# Patient Record
Sex: MALE | Race: WHITE | NOT HISPANIC OR LATINO | URBAN - METROPOLITAN AREA
[De-identification: names, ages, dates, MRNs, and addresses within clinical notes are randomized per-mention and may not be internally consistent; named-entity substitution may affect disease eponyms.]

---

## 2017-03-29 ENCOUNTER — OUTPATIENT (OUTPATIENT)
Dept: OUTPATIENT SERVICES | Facility: HOSPITAL | Age: 82
LOS: 1 days | End: 2017-03-29

## 2017-07-03 ENCOUNTER — INPATIENT (INPATIENT)
Facility: HOSPITAL | Age: 82
LOS: 7 days | Discharge: NURSING, REHAB OR LONG-TERM CARE | End: 2017-07-11
Payer: MEDICARE

## 2017-07-03 ENCOUNTER — OUTPATIENT (OUTPATIENT)
Dept: OUTPATIENT SERVICES | Facility: HOSPITAL | Age: 82
LOS: 1 days | End: 2017-07-03

## 2017-07-03 PROCEDURE — 73502 X-RAY EXAM HIP UNI 2-3 VIEWS: CPT | Mod: 26,RT

## 2017-07-03 PROCEDURE — 71260 CT THORAX DX C+: CPT | Mod: 26

## 2017-07-03 PROCEDURE — 70450 CT HEAD/BRAIN W/O DYE: CPT | Mod: 26

## 2017-07-03 PROCEDURE — 71101 X-RAY EXAM UNILAT RIBS/CHEST: CPT | Mod: 26,RT

## 2017-07-03 PROCEDURE — 74177 CT ABD & PELVIS W/CONTRAST: CPT | Mod: 26

## 2017-07-03 PROCEDURE — 72170 X-RAY EXAM OF PELVIS: CPT | Mod: 26

## 2017-07-03 PROCEDURE — 99284 EMERGENCY DEPT VISIT MOD MDM: CPT

## 2017-07-03 PROCEDURE — 72125 CT NECK SPINE W/O DYE: CPT | Mod: 26

## 2017-07-03 PROCEDURE — 71010: CPT | Mod: 26

## 2017-07-04 ENCOUNTER — OUTPATIENT (OUTPATIENT)
Dept: OUTPATIENT SERVICES | Facility: HOSPITAL | Age: 82
LOS: 1 days | End: 2017-07-04

## 2017-07-04 PROCEDURE — 73501 X-RAY EXAM HIP UNI 1 VIEW: CPT | Mod: 26,RT

## 2017-07-05 ENCOUNTER — OUTPATIENT (OUTPATIENT)
Dept: OUTPATIENT SERVICES | Facility: HOSPITAL | Age: 82
LOS: 1 days | End: 2017-07-05

## 2017-07-05 PROCEDURE — 99232 SBSQ HOSP IP/OBS MODERATE 35: CPT

## 2017-07-06 ENCOUNTER — OUTPATIENT (OUTPATIENT)
Dept: OUTPATIENT SERVICES | Facility: HOSPITAL | Age: 82
LOS: 1 days | End: 2017-07-06

## 2017-07-06 PROCEDURE — 76770 US EXAM ABDO BACK WALL COMP: CPT | Mod: 26

## 2017-07-06 PROCEDURE — 99232 SBSQ HOSP IP/OBS MODERATE 35: CPT

## 2017-07-06 PROCEDURE — 76856 US EXAM PELVIC COMPLETE: CPT | Mod: 26

## 2017-07-07 ENCOUNTER — OUTPATIENT (OUTPATIENT)
Dept: OUTPATIENT SERVICES | Facility: HOSPITAL | Age: 82
LOS: 1 days | End: 2017-07-07

## 2017-07-07 PROCEDURE — 71010: CPT | Mod: 26

## 2017-07-08 ENCOUNTER — OUTPATIENT (OUTPATIENT)
Dept: OUTPATIENT SERVICES | Facility: HOSPITAL | Age: 82
LOS: 1 days | End: 2017-07-08

## 2017-07-09 ENCOUNTER — OUTPATIENT (OUTPATIENT)
Dept: OUTPATIENT SERVICES | Facility: HOSPITAL | Age: 82
LOS: 1 days | End: 2017-07-09

## 2017-07-10 ENCOUNTER — OUTPATIENT (OUTPATIENT)
Dept: OUTPATIENT SERVICES | Facility: HOSPITAL | Age: 82
LOS: 1 days | End: 2017-07-10

## 2017-07-10 PROCEDURE — 71020: CPT | Mod: 26

## 2018-05-15 PROCEDURE — 72170 X-RAY EXAM OF PELVIS: CPT | Mod: 26,59

## 2018-05-15 PROCEDURE — 73503 X-RAY EXAM HIP UNI 4/> VIEWS: CPT | Mod: 26,LT

## 2018-05-15 PROCEDURE — 71045 X-RAY EXAM CHEST 1 VIEW: CPT | Mod: 26

## 2018-05-15 PROCEDURE — 99285 EMERGENCY DEPT VISIT HI MDM: CPT

## 2018-05-16 ENCOUNTER — OUTPATIENT (OUTPATIENT)
Dept: OUTPATIENT SERVICES | Facility: HOSPITAL | Age: 83
LOS: 1 days | End: 2018-05-16

## 2018-05-16 ENCOUNTER — INPATIENT (INPATIENT)
Facility: HOSPITAL | Age: 83
LOS: 2 days | Discharge: NURSING, REHAB OR LONG-TERM CARE | End: 2018-05-19
Payer: MEDICARE

## 2018-05-16 PROCEDURE — 73501 X-RAY EXAM HIP UNI 1 VIEW: CPT | Mod: 26,LT

## 2018-05-16 PROCEDURE — 72125 CT NECK SPINE W/O DYE: CPT | Mod: 26

## 2018-05-16 PROCEDURE — 74177 CT ABD & PELVIS W/CONTRAST: CPT | Mod: 26

## 2018-05-16 PROCEDURE — 99223 1ST HOSP IP/OBS HIGH 75: CPT

## 2018-05-16 PROCEDURE — 70450 CT HEAD/BRAIN W/O DYE: CPT | Mod: 26

## 2018-05-16 PROCEDURE — 71260 CT THORAX DX C+: CPT | Mod: 26

## 2018-05-17 ENCOUNTER — OUTPATIENT (OUTPATIENT)
Dept: OUTPATIENT SERVICES | Facility: HOSPITAL | Age: 83
LOS: 1 days | End: 2018-05-17

## 2018-05-17 PROCEDURE — 99232 SBSQ HOSP IP/OBS MODERATE 35: CPT

## 2018-05-18 ENCOUNTER — OUTPATIENT (OUTPATIENT)
Dept: OUTPATIENT SERVICES | Facility: HOSPITAL | Age: 83
LOS: 1 days | End: 2018-05-18

## 2018-05-25 ENCOUNTER — OUTPATIENT (OUTPATIENT)
Dept: OUTPATIENT SERVICES | Facility: HOSPITAL | Age: 83
LOS: 1 days | End: 2018-05-25

## 2018-05-25 ENCOUNTER — EMERGENCY (EMERGENCY)
Facility: HOSPITAL | Age: 83
LOS: 1 days | End: 2018-05-25
Payer: MEDICARE

## 2018-05-25 PROCEDURE — 71045 X-RAY EXAM CHEST 1 VIEW: CPT | Mod: 26

## 2018-05-25 PROCEDURE — 99291 CRITICAL CARE FIRST HOUR: CPT

## 2018-05-26 ENCOUNTER — INPATIENT (INPATIENT)
Facility: HOSPITAL | Age: 83
LOS: 12 days | Discharge: NURSING, REHAB OR LONG-TERM CARE | DRG: 871 | End: 2018-06-08
Attending: HOSPITALIST | Admitting: HOSPITALIST
Payer: MEDICARE

## 2018-05-26 VITALS
HEART RATE: 103 BPM | RESPIRATION RATE: 20 BRPM | SYSTOLIC BLOOD PRESSURE: 93 MMHG | DIASTOLIC BLOOD PRESSURE: 54 MMHG | OXYGEN SATURATION: 97 %

## 2018-05-26 DIAGNOSIS — A41.9 SEPSIS, UNSPECIFIED ORGANISM: ICD-10-CM

## 2018-05-26 LAB
ALBUMIN SERPL ELPH-MCNC: 2.7 G/DL — LOW (ref 3.3–5.2)
ALP SERPL-CCNC: 130 U/L — HIGH (ref 40–120)
ALT FLD-CCNC: 8 U/L — SIGNIFICANT CHANGE UP
ANION GAP SERPL CALC-SCNC: 13 MMOL/L — SIGNIFICANT CHANGE UP (ref 5–17)
APPEARANCE UR: ABNORMAL
APTT BLD: 28.4 SEC — SIGNIFICANT CHANGE UP (ref 27.5–37.4)
AST SERPL-CCNC: 36 U/L — SIGNIFICANT CHANGE UP
BACTERIA # UR AUTO: ABNORMAL
BASOPHILS # BLD AUTO: 0 K/UL — SIGNIFICANT CHANGE UP (ref 0–0.2)
BASOPHILS NFR BLD AUTO: 0.1 % — SIGNIFICANT CHANGE UP (ref 0–2)
BILIRUB SERPL-MCNC: 0.6 MG/DL — SIGNIFICANT CHANGE UP (ref 0.4–2)
BILIRUB UR-MCNC: NEGATIVE — SIGNIFICANT CHANGE UP
BLD GP AB SCN SERPL QL: SIGNIFICANT CHANGE UP
BUN SERPL-MCNC: 43 MG/DL — HIGH (ref 8–20)
CALCIUM SERPL-MCNC: 7.7 MG/DL — LOW (ref 8.6–10.2)
CHLORIDE SERPL-SCNC: 109 MMOL/L — HIGH (ref 98–107)
CK MB CFR SERPL CALC: 16 NG/ML — HIGH (ref 0–6.7)
CK SERPL-CCNC: 622 U/L — HIGH (ref 30–200)
CO2 SERPL-SCNC: 21 MMOL/L — LOW (ref 22–29)
COLOR SPEC: YELLOW — SIGNIFICANT CHANGE UP
CREAT SERPL-MCNC: 2.5 MG/DL — HIGH (ref 0.5–1.3)
DIFF PNL FLD: ABNORMAL
EOSINOPHIL # BLD AUTO: 0.1 K/UL — SIGNIFICANT CHANGE UP (ref 0–0.5)
EOSINOPHIL NFR BLD AUTO: 0.4 % — SIGNIFICANT CHANGE UP (ref 0–5)
EPI CELLS # UR: SIGNIFICANT CHANGE UP
GLUCOSE SERPL-MCNC: 122 MG/DL — HIGH (ref 70–115)
GLUCOSE UR QL: NEGATIVE MG/DL — SIGNIFICANT CHANGE UP
HCT VFR BLD CALC: 25 % — LOW (ref 42–52)
HGB BLD-MCNC: 8.1 G/DL — LOW (ref 14–18)
INR BLD: 1.2 RATIO — HIGH (ref 0.88–1.16)
KETONES UR-MCNC: NEGATIVE — SIGNIFICANT CHANGE UP
LACTATE BLDV-MCNC: 1.4 MMOL/L — SIGNIFICANT CHANGE UP (ref 0.5–2)
LEUKOCYTE ESTERASE UR-ACNC: ABNORMAL
LIDOCAIN IGE QN: 11 U/L — LOW (ref 22–51)
LYMPHOCYTES # BLD AUTO: 1.2 K/UL — SIGNIFICANT CHANGE UP (ref 1–4.8)
LYMPHOCYTES # BLD AUTO: 8.3 % — LOW (ref 20–55)
MAGNESIUM SERPL-MCNC: 2.2 MG/DL — SIGNIFICANT CHANGE UP (ref 1.6–2.6)
MCHC RBC-ENTMCNC: 29.6 PG — SIGNIFICANT CHANGE UP (ref 27–31)
MCHC RBC-ENTMCNC: 32.4 G/DL — SIGNIFICANT CHANGE UP (ref 32–36)
MCV RBC AUTO: 91.2 FL — SIGNIFICANT CHANGE UP (ref 80–94)
MONOCYTES # BLD AUTO: 1.1 K/UL — HIGH (ref 0–0.8)
MONOCYTES NFR BLD AUTO: 7.5 % — SIGNIFICANT CHANGE UP (ref 3–10)
NEUTROPHILS # BLD AUTO: 12.4 K/UL — HIGH (ref 1.8–8)
NEUTROPHILS NFR BLD AUTO: 83.4 % — HIGH (ref 37–73)
NITRITE UR-MCNC: NEGATIVE — SIGNIFICANT CHANGE UP
PH UR: 6 — SIGNIFICANT CHANGE UP (ref 5–8)
PHOSPHATE SERPL-MCNC: 3.1 MG/DL — SIGNIFICANT CHANGE UP (ref 2.4–4.7)
PLATELET # BLD AUTO: 200 K/UL — SIGNIFICANT CHANGE UP (ref 150–400)
POTASSIUM SERPL-MCNC: 3.9 MMOL/L — SIGNIFICANT CHANGE UP (ref 3.5–5.3)
POTASSIUM SERPL-SCNC: 3.9 MMOL/L — SIGNIFICANT CHANGE UP (ref 3.5–5.3)
PROT SERPL-MCNC: 5.5 G/DL — LOW (ref 6.6–8.7)
PROT UR-MCNC: 100 MG/DL
PROTHROM AB SERPL-ACNC: 13.3 SEC — HIGH (ref 9.8–12.7)
RBC # BLD: 2.74 M/UL — LOW (ref 4.6–6.2)
RBC # FLD: 14.5 % — SIGNIFICANT CHANGE UP (ref 11–15.6)
RBC CASTS # UR COMP ASSIST: ABNORMAL /HPF (ref 0–4)
SODIUM SERPL-SCNC: 143 MMOL/L — SIGNIFICANT CHANGE UP (ref 135–145)
SP GR SPEC: 1.01 — SIGNIFICANT CHANGE UP (ref 1.01–1.02)
TROPONIN T SERPL-MCNC: 0.23 NG/ML — HIGH (ref 0–0.06)
TYPE + AB SCN PNL BLD: SIGNIFICANT CHANGE UP
UROBILINOGEN FLD QL: NEGATIVE MG/DL — SIGNIFICANT CHANGE UP
VALPROATE SERPL-MCNC: 17.2 UG/ML — LOW (ref 50–100)
WBC # BLD: 14.8 K/UL — HIGH (ref 4.8–10.8)
WBC # FLD AUTO: 14.8 K/UL — HIGH (ref 4.8–10.8)
WBC UR QL: SIGNIFICANT CHANGE UP

## 2018-05-26 PROCEDURE — 99285 EMERGENCY DEPT VISIT HI MDM: CPT

## 2018-05-26 PROCEDURE — 71045 X-RAY EXAM CHEST 1 VIEW: CPT | Mod: 26

## 2018-05-26 PROCEDURE — 93010 ELECTROCARDIOGRAM REPORT: CPT

## 2018-05-26 PROCEDURE — 93970 EXTREMITY STUDY: CPT | Mod: 26

## 2018-05-26 PROCEDURE — 12345: CPT | Mod: NC

## 2018-05-26 RX ORDER — QUETIAPINE FUMARATE 200 MG/1
50 TABLET, FILM COATED ORAL DAILY
Qty: 0 | Refills: 0 | Status: DISCONTINUED | OUTPATIENT
Start: 2018-05-26 | End: 2018-06-08

## 2018-05-26 RX ORDER — DIVALPROEX SODIUM 500 MG/1
250 TABLET, DELAYED RELEASE ORAL
Qty: 0 | Refills: 0 | Status: DISCONTINUED | OUTPATIENT
Start: 2018-05-26 | End: 2018-05-26

## 2018-05-26 RX ORDER — PIPERACILLIN AND TAZOBACTAM 4; .5 G/20ML; G/20ML
3.38 INJECTION, POWDER, LYOPHILIZED, FOR SOLUTION INTRAVENOUS ONCE
Qty: 0 | Refills: 0 | Status: DISCONTINUED | OUTPATIENT
Start: 2018-05-26 | End: 2018-05-26

## 2018-05-26 RX ORDER — VANCOMYCIN HCL 1 G
1000 VIAL (EA) INTRAVENOUS ONCE
Qty: 0 | Refills: 0 | Status: COMPLETED | OUTPATIENT
Start: 2018-05-26 | End: 2018-05-26

## 2018-05-26 RX ORDER — SACCHAROMYCES BOULARDII 250 MG
250 POWDER IN PACKET (EA) ORAL
Qty: 0 | Refills: 0 | Status: DISCONTINUED | OUTPATIENT
Start: 2018-05-26 | End: 2018-06-06

## 2018-05-26 RX ORDER — QUETIAPINE FUMARATE 200 MG/1
100 TABLET, FILM COATED ORAL AT BEDTIME
Qty: 0 | Refills: 0 | Status: DISCONTINUED | OUTPATIENT
Start: 2018-05-26 | End: 2018-06-03

## 2018-05-26 RX ORDER — DONEPEZIL HYDROCHLORIDE 10 MG/1
5 TABLET, FILM COATED ORAL AT BEDTIME
Qty: 0 | Refills: 0 | Status: DISCONTINUED | OUTPATIENT
Start: 2018-05-26 | End: 2018-06-08

## 2018-05-26 RX ORDER — PIPERACILLIN AND TAZOBACTAM 4; .5 G/20ML; G/20ML
3.38 INJECTION, POWDER, LYOPHILIZED, FOR SOLUTION INTRAVENOUS EVERY 8 HOURS
Qty: 0 | Refills: 0 | Status: DISCONTINUED | OUTPATIENT
Start: 2018-05-26 | End: 2018-05-27

## 2018-05-26 RX ORDER — ATORVASTATIN CALCIUM 80 MG/1
80 TABLET, FILM COATED ORAL AT BEDTIME
Qty: 0 | Refills: 0 | Status: DISCONTINUED | OUTPATIENT
Start: 2018-05-26 | End: 2018-06-08

## 2018-05-26 RX ORDER — SODIUM CHLORIDE 9 MG/ML
1000 INJECTION INTRAMUSCULAR; INTRAVENOUS; SUBCUTANEOUS
Qty: 0 | Refills: 0 | Status: DISCONTINUED | OUTPATIENT
Start: 2018-05-26 | End: 2018-05-28

## 2018-05-26 RX ORDER — MEMANTINE HYDROCHLORIDE 10 MG/1
10 TABLET ORAL DAILY
Qty: 0 | Refills: 0 | Status: DISCONTINUED | OUTPATIENT
Start: 2018-05-26 | End: 2018-06-08

## 2018-05-26 RX ADMIN — Medication 2 MILLIGRAM(S): at 21:18

## 2018-05-26 RX ADMIN — Medication 250 MILLIGRAM(S): at 18:34

## 2018-05-26 RX ADMIN — SODIUM CHLORIDE 150 MILLILITER(S): 9 INJECTION INTRAMUSCULAR; INTRAVENOUS; SUBCUTANEOUS at 11:00

## 2018-05-26 NOTE — H&P ADULT - ASSESSMENT
84M with altered mental status and urine retention    Sepsis / Urinary tract infection - Empiric antibiotics were initiated. Urine studies pending.    Urine retention - Urinary catheter placed by Urology. Initially with hematuria, but now the urine is slightly tinged after continuous bladder irrigation.    Acute kidney injury - Urinary catheter in place and draining freely. Repeat laboratory studies ordered to monitor.    Toxic metabolic encephalopathy. Dementia - On donepazil and memantine. Constant observation initiated in the emergency department as the patient was pulling on urinary catheter. CT of the head is pending. The patient was also noted to be on divalproex which will be continued.    Hypertension - Antihypertensive medications to be held until the blood pressure has improved.    Lower extremity edema - Lower extremity doppler pending.    CAD - On atorvastatin.    MOLST form reviewed with DNR/DNI noted. 84M with altered mental status and urine retention    Sepsis / Urinary tract infection - Empiric antibiotics were initiated. Urine studies pending.    Urine retention - Urinary catheter placed by Urology. Initially with hematuria, but now the urine is slightly tinged after continuous bladder irrigation.    Acute kidney injury - Urinary catheter in place and draining freely. Repeat laboratory studies ordered to monitor.    Toxic metabolic encephalopathy. Dementia - On donepazil and memantine. Constant observation initiated in the emergency department as the patient was pulling on urinary catheter. CT of the head is pending. The patient was also noted to be on divalproex but the family reported that he was previously on quetiapine which was more effective.    Hypertension - Antihypertensive medications to be held until the blood pressure has improved.    Lower extremity edema - Lower extremity doppler pending.    CAD - On atorvastatin.    MOLST form reviewed with DNR/DNI noted.

## 2018-05-26 NOTE — ED PROVIDER NOTE - PHYSICAL EXAMINATION
Constitutional : Appears uncomfortably, talking in incomprehensible words  Head :NC AT , no swelling  Eyes :eomi spontaneous no swelling  Mouth :mm moist,  Neck : supple, trachea in midline  Chest :Oscar air entry, symm chest expansion, no distress  Heart :S1 S2 distant  Abdomen :abd soft, suprapubic fullness noted, moans on palpation  no groin erythema, early attemptd in ed  Musc/Skel :ext + swelling, no deformity, no step off, distal pulses present  moves ext spontaneously, surgical scar from hip noted, no surrounding erythema, no swelling  Neuro  :AA unable to evaluate no communication, moves upper ext,

## 2018-05-26 NOTE — H&P ADULT - HISTORY OF PRESENT ILLNESS
The patient is noted to be awake and alert on interview but unable to provide any significant history. Information was obtained through review of the available transfer documentation.   84M with a prior admission to [facility] for a hip fracture and subsequent transfer to [facility] who was found to have altered mental status and urine retention. The patient was transferred to [facility] for further management. Patel catheter placement was attempted with resulting hematuria and inability to pass the catheter. The patient was transferred to [facility] for further treatment. Transfer documentation noted that the patient was found to be combative and confused. There was noted of decreased urine output. There was also noted of a fever of 101.3. The patient had no specific complaints. There was also documentation of a prior conversation with the patient's family who also noted similar confusion after his prior hip surgery. Further information is otherwise limited due to the patient's medical condition.  In the emergency department, the patient was seen by Urology in consultation who noted a history of prostate cancer and was able to pass a urine catheter with resulting hematuria.    PSH: Hip replacement, Knee surgery

## 2018-05-26 NOTE — ED ADULT NURSE NOTE - CHIEF COMPLAINT QUOTE
pt transfer from [place] for urinary consult. per Jakob RN @ [facility], pt sent to ED for urinary difficulty, unable to get early on pt so pt transferred to [facility] for urology team consult. hx dementia. pt received on dopamine drip at 10mcg/kg/min. BP in ED 93/54, .  MD called to bedside for eval. pt transferred to CC room.

## 2018-05-26 NOTE — PROCEDURE NOTE - NSURITECHNIQUE_GU_A_CORE
All applicable medical record documentation is completed/Proper hand hygiene was performed/Sterile gloves were worn for the duration of the procedure/The catheter was appropriately lubricated/The urinary drainage system is closed at the end of the procedure/The collection bag is below the level of the patient and urinary bladder

## 2018-05-26 NOTE — PROCEDURE NOTE - NSFINDINGS_GEN_A_CORE
unable to place Stat-Lock due to small amount of catheter coming out of urethral meatus/hematuria/positive return of urine in the collection bag

## 2018-05-26 NOTE — ED ADULT NURSE REASSESSMENT NOTE - NS ED NURSE REASSESS COMMENT FT1
Late Note  Time is approximate  Report received at change of shift from outgoing MILADIS Moran and assumed care of pt at that time. Pt received on stretcher in yellow gown in no acute distress, resting comfortably at present but arouses to voice and subsequently confused, unable to answer questions appropriately, at risk for harm by pulling out early and or IV tubing, at risk for falls, NA at bedside at all times for one to one constant observation. Pt noted to have Dopamine infusing and this was discontinued by outgoing MILADIS Jaramillo as no longer needed. Will maintain safety and continue to monitor.

## 2018-05-26 NOTE — PROCEDURE NOTE - PROCEDURE
<<-----Click on this checkbox to enter Procedure Patel catheter to dependent drainage  05/26/2018  CBI started  Active  [email or web address]

## 2018-05-26 NOTE — ED PROVIDER NOTE - OBJECTIVE STATEMENT
84y old was seen and transferred from [facility]. patient was seen for a fall, has had hip fracture, was transferred to a NH, sent back to [facility] for ams, found to have fever, labs and sepsis protocol followed, unable to urinate, early attempted unsuccessful, and transferred to [place] for early

## 2018-05-26 NOTE — PROCEDURE NOTE - NSINDICATIONS_GEN_A_CORE
other/bladder distention/urinry obstruction or retention/inability of staff at 2 hospitals to insert early

## 2018-05-26 NOTE — H&P ADULT - NSHPPHYSICALEXAM_GEN_ALL_CORE
Vital Signs Last 24 Hrs  T(C): 37 (26 May 2018 04:01), Max: 37 (26 May 2018 04:01)  T(F): 98.6 (26 May 2018 04:01), Max: 98.6 (26 May 2018 04:01)  HR: 92 (26 May 2018 08:23) (92 - 104)  BP: 105/52 (26 May 2018 08:23) (90/51 - 113/54)  BP(mean): --  RR: 18 (26 May 2018 08:23) (18 - 20)  SpO2: 100% (26 May 2018 08:23) (97% - 100%)    General appearance: No acute distress, Awake, Alert, Disoriented  HEENT: Normocephalic, Atraumatic, Conjunctiva clear, EOMI  Neck: Supple, No JVD, No tenderness  Lungs: Clear to auscultation, Breath sound equal bilaterally, No wheezes, No rales  Cardiovascular: S1S2, Regular rhythm  Abdomen: Soft, Nontender, Nondistended, No guarding/rebound, Positive bowel sounds  Extremities: No clubbing, No cyanosis, No calf tenderness, Lower extremity edema  Neuro: Strength equal bilaterally, No tremors  Psychiatric: Appropriate mood, Normal affect

## 2018-05-26 NOTE — CONSULT NOTE ADULT - SUBJECTIVE AND OBJECTIVE BOX
ABBY VIDAL  [id number]  84y, Male        Patient is a 84y old  Male who presents with a chief complaint of abdominal pain    HPI:  Patient presents in transfer from [facility] where he was noted to be septic. bladder scan PVR showed >999 ml. Early not able to be passed.  Patient transferred here due to lack of urologic care there. No apparent fever there but BP apparently low. Had recent hip surgery. Bowel status uncertain. activity status uncertain. Patient unable to provide history. Has baseline dementia. Has PMH of prostate cancer but uncertain how this was treated.     Allergies    No Known Allergies    Intolerances        MEDICATIONS  (STANDING):    MEDICATIONS  (PRN):      PAST MEDICAL & SURGICAL HISTORY:  Asthma  Anxiety  MI (myocardial infarction)  High cholesterol  Prostate CA  HTN (hypertension)  Dementia      REVIEW OF SYSTEMS  unable to obtain due to dementia.    Tob: uncertain                              EtOH: undertain    I&O's Detail      PE:    Vital Signs Last 24 Hrs  T(C): 37 (26 May 2018 04:01), Max: 37 (26 May 2018 04:01)  T(F): 98.6 (26 May 2018 04:01), Max: 98.6 (26 May 2018 04:01)  HR: 103 (26 May 2018 06:35) (103 - 104)  BP: 113/54 (26 May 2018 06:35) (90/51 - 113/54)  BP(mean): --  RR: 18 (26 May 2018 06:35) (18 - 20)  SpO2: 98% (26 May 2018 06:35) (97% - 98%)    Daily Height in cm: 187.96 (26 May 2018 04:10)    Daily     PHYSICAL EXAM:      Constitutional: appears acutely uncomfortable, not able to lie still.     Eyes: conjunctivae normal    ENMT: NC/AT    Neck: no adenopathy    Respiratory: no respiratory distress    Cardiovascular: good cap refill    Gastrointestinal: lower abdominal mass, midline, tender, no peritoneal signs, no upper abdominal tenderness.    Genitourinary: uncirc phallus, early in place but not in bladder based on amount of catheter outside of patient's body. no urine in bag. meatus otherwise normal. scrotum normal.     Rectal: unable to perform    Extremities: soem peripheral edema noted.     Neurological: not oriented    Skin: no jaundice    Musculoskeletal: good strength    Psychiatric: not oriented        Labs:    Impression:  Urinary retention  abdominal pain due to urinary retention      Plan:    I was able to palce a early using a catheter guide ,urojet.  see separate note.  Recommend early for one week at minimum.    Maximize bowel function  maximize activity as allowable for patient safety and from ortho perspective.  would try tamsulosin once he is not needing pressors anymore.     Jerrell Alonzo MD  05-26-18 @ 06:45

## 2018-05-26 NOTE — H&P ADULT - PMH
Anxiety    Asthma    Dementia    High cholesterol    HTN (hypertension)    MI (myocardial infarction)    Prostate CA

## 2018-05-26 NOTE — ED ADULT NURSE NOTE - OBJECTIVE STATEMENT
Pt received in Critical care as a transfer from [place] for urinary retention and increased AMS according to the rehab center. According to [facility], providers at [place] were unable to obtain a Patel after 3 attempts. Pt with hx of dementia and refusing to speak to RN. Pt appears comfortable and in NAD but bladder is noticeably distended and pt presents with blood at the urethral meatus. Pt uncooperative and is attempting to get off the stretcher but refuses to say what for. Pt received on Dopamine Drip @ 33.1mcg/kg/min for persistent hypotension. Dr. Dunne at the bedside. Pt received in Critical care as a transfer from [place] for urinary retention and increased AMS according to the rehab center. According to [facility], providers at [place] were unable to obtain a Patel after 3 attempts. Pt with hx of dementia and refusing to speak to RN. Pt appears comfortable and in NAD but bladder is noticeably distended and pt presents with blood at the urethral meatus. Pt uncooperative and is attempting to get off the stretcher but refuses to say what for. Pt received on Dopamine Drip @ 10mcg/kg/min for persistent hypotension. Dr. Dunne at the bedside.

## 2018-05-26 NOTE — PROCEDURE NOTE - NSPROCDETAILS_GEN_ALL_CORE
sterile technique, indwelling urinary device inserted/previously placed early removed prior to prep and insertion of catheter

## 2018-05-26 NOTE — ED PROVIDER NOTE - MEDICAL DECISION MAKING DETAILS
84y old from [facility], with urinary retention, fever, plan early, abox, fluids

## 2018-05-26 NOTE — ED ADULT NURSE REASSESSMENT NOTE - NS ED NURSE REASSESS COMMENT FT1
Lab was called and is aware of stat blood work ordered and location of patient and will come and draw labs.

## 2018-05-26 NOTE — ED ADULT TRIAGE NOTE - CHIEF COMPLAINT QUOTE
pt transfer from [place] for urinary consult. per Jakob RN @ [facility], pt sent to ED for urinary difficulty, unable to get early on pt so pt transferred to [facility] for urology team consult. hx dementia. pt received on dopamine drip at 10mcg/kg/min. BP in ED 93/54, .  MD called to bedside for eval. pt transferred to CC room.

## 2018-05-26 NOTE — ED PROVIDER NOTE - PROGRESS NOTE DETAILS
sharath attemptd in ed, with some blood clots, unsuccessful  urology call, came to see patient in ed patient was given dopamine for transfer , dc in ed after early, and recd fluids

## 2018-05-27 LAB
ANION GAP SERPL CALC-SCNC: 15 MMOL/L — SIGNIFICANT CHANGE UP (ref 5–17)
BUN SERPL-MCNC: 39 MG/DL — HIGH (ref 8–20)
CALCIUM SERPL-MCNC: 8 MG/DL — LOW (ref 8.6–10.2)
CHLORIDE SERPL-SCNC: 108 MMOL/L — HIGH (ref 98–107)
CO2 SERPL-SCNC: 21 MMOL/L — LOW (ref 22–29)
CREAT SERPL-MCNC: 1.52 MG/DL — HIGH (ref 0.5–1.3)
GLUCOSE SERPL-MCNC: 109 MG/DL — SIGNIFICANT CHANGE UP (ref 70–115)
HCT VFR BLD CALC: 25.1 % — LOW (ref 42–52)
HGB BLD-MCNC: 8 G/DL — LOW (ref 14–18)
MCHC RBC-ENTMCNC: 29.4 PG — SIGNIFICANT CHANGE UP (ref 27–31)
MCHC RBC-ENTMCNC: 31.9 G/DL — LOW (ref 32–36)
MCV RBC AUTO: 92.3 FL — SIGNIFICANT CHANGE UP (ref 80–94)
PLATELET # BLD AUTO: 227 K/UL — SIGNIFICANT CHANGE UP (ref 150–400)
POTASSIUM SERPL-MCNC: 3.8 MMOL/L — SIGNIFICANT CHANGE UP (ref 3.5–5.3)
POTASSIUM SERPL-SCNC: 3.8 MMOL/L — SIGNIFICANT CHANGE UP (ref 3.5–5.3)
RBC # BLD: 2.72 M/UL — LOW (ref 4.6–6.2)
RBC # FLD: 14.1 % — SIGNIFICANT CHANGE UP (ref 11–15.6)
SODIUM SERPL-SCNC: 144 MMOL/L — SIGNIFICANT CHANGE UP (ref 135–145)
WBC # BLD: 10.9 K/UL — HIGH (ref 4.8–10.8)
WBC # FLD AUTO: 10.9 K/UL — HIGH (ref 4.8–10.8)

## 2018-05-27 PROCEDURE — 99233 SBSQ HOSP IP/OBS HIGH 50: CPT

## 2018-05-27 PROCEDURE — 70450 CT HEAD/BRAIN W/O DYE: CPT | Mod: 26

## 2018-05-27 PROCEDURE — 99497 ADVNCD CARE PLAN 30 MIN: CPT

## 2018-05-27 RX ORDER — ATORVASTATIN CALCIUM 80 MG/1
0 TABLET, FILM COATED ORAL
Qty: 0 | Refills: 0 | COMMUNITY

## 2018-05-27 RX ORDER — OXYCODONE HYDROCHLORIDE 5 MG/1
0 TABLET ORAL
Qty: 0 | Refills: 0 | COMMUNITY

## 2018-05-27 RX ORDER — METOPROLOL TARTRATE 50 MG
0 TABLET ORAL
Qty: 0 | Refills: 0 | COMMUNITY

## 2018-05-27 RX ORDER — TAMSULOSIN HYDROCHLORIDE 0.4 MG/1
0.4 CAPSULE ORAL AT BEDTIME
Qty: 0 | Refills: 0 | Status: DISCONTINUED | OUTPATIENT
Start: 2018-05-27 | End: 2018-05-28

## 2018-05-27 RX ORDER — DIVALPROEX SODIUM 500 MG/1
0 TABLET, DELAYED RELEASE ORAL
Qty: 0 | Refills: 0 | COMMUNITY

## 2018-05-27 RX ORDER — TAMSULOSIN HYDROCHLORIDE 0.4 MG/1
0 CAPSULE ORAL
Qty: 0 | Refills: 0 | COMMUNITY

## 2018-05-27 RX ORDER — AMLODIPINE BESYLATE 2.5 MG/1
0 TABLET ORAL
Qty: 0 | Refills: 0 | COMMUNITY

## 2018-05-27 RX ORDER — FUROSEMIDE 40 MG
0 TABLET ORAL
Qty: 0 | Refills: 0 | COMMUNITY

## 2018-05-27 RX ORDER — PIPERACILLIN AND TAZOBACTAM 4; .5 G/20ML; G/20ML
3.38 INJECTION, POWDER, LYOPHILIZED, FOR SOLUTION INTRAVENOUS EVERY 8 HOURS
Qty: 0 | Refills: 0 | Status: DISCONTINUED | OUTPATIENT
Start: 2018-05-27 | End: 2018-05-31

## 2018-05-27 RX ADMIN — QUETIAPINE FUMARATE 100 MILLIGRAM(S): 200 TABLET, FILM COATED ORAL at 22:51

## 2018-05-27 RX ADMIN — Medication 250 MILLIGRAM(S): at 06:17

## 2018-05-27 RX ADMIN — PIPERACILLIN AND TAZOBACTAM 25 GRAM(S): 4; .5 INJECTION, POWDER, LYOPHILIZED, FOR SOLUTION INTRAVENOUS at 22:51

## 2018-05-27 RX ADMIN — TAMSULOSIN HYDROCHLORIDE 0.4 MILLIGRAM(S): 0.4 CAPSULE ORAL at 22:51

## 2018-05-27 RX ADMIN — ATORVASTATIN CALCIUM 80 MILLIGRAM(S): 80 TABLET, FILM COATED ORAL at 00:28

## 2018-05-27 RX ADMIN — ATORVASTATIN CALCIUM 80 MILLIGRAM(S): 80 TABLET, FILM COATED ORAL at 22:51

## 2018-05-27 RX ADMIN — DONEPEZIL HYDROCHLORIDE 5 MILLIGRAM(S): 10 TABLET, FILM COATED ORAL at 22:51

## 2018-05-27 RX ADMIN — MEMANTINE HYDROCHLORIDE 10 MILLIGRAM(S): 10 TABLET ORAL at 13:17

## 2018-05-27 RX ADMIN — DONEPEZIL HYDROCHLORIDE 5 MILLIGRAM(S): 10 TABLET, FILM COATED ORAL at 00:28

## 2018-05-27 RX ADMIN — PIPERACILLIN AND TAZOBACTAM 25 GRAM(S): 4; .5 INJECTION, POWDER, LYOPHILIZED, FOR SOLUTION INTRAVENOUS at 06:17

## 2018-05-27 RX ADMIN — QUETIAPINE FUMARATE 50 MILLIGRAM(S): 200 TABLET, FILM COATED ORAL at 13:18

## 2018-05-27 RX ADMIN — QUETIAPINE FUMARATE 100 MILLIGRAM(S): 200 TABLET, FILM COATED ORAL at 00:28

## 2018-05-27 RX ADMIN — PIPERACILLIN AND TAZOBACTAM 25 GRAM(S): 4; .5 INJECTION, POWDER, LYOPHILIZED, FOR SOLUTION INTRAVENOUS at 00:28

## 2018-05-27 RX ADMIN — PIPERACILLIN AND TAZOBACTAM 25 GRAM(S): 4; .5 INJECTION, POWDER, LYOPHILIZED, FOR SOLUTION INTRAVENOUS at 13:18

## 2018-05-27 RX ADMIN — Medication 250 MILLIGRAM(S): at 17:17

## 2018-05-27 NOTE — PROGRESS NOTE ADULT - SUBJECTIVE AND OBJECTIVE BOX
ABBY VIDAL  ----------------------------------------  The patient was seen and evaluated for urine retention.  The patient is in no acute distress.  Offers no complaints.  Aid at bedside reports that the patient tolerated breakfast.    Vital Signs Last 24 Hrs  T(C): 36.4 (27 May 2018 08:48), Max: 36.7 (26 May 2018 23:22)  T(F): 97.5 (27 May 2018 08:48), Max: 98 (26 May 2018 23:22)  HR: 85 (27 May 2018 08:48) (84 - 92)  BP: 134/81 (27 May 2018 08:48) (106/62 - 134/81)  BP(mean): --  RR: 18 (27 May 2018 08:48) (17 - 18)  SpO2: 95% (27 May 2018 08:48) (92% - 98%)    PHYSICAL EXAMINATION:  ----------------------------------------  General appearance: No acute distress, Awake, Alert, Somnolent  HEENT: Normocephalic, Atraumatic, Conjunctiva clear, EOMI  Neck: Supple, No JVD, No tenderness  Lungs: Clear to auscultation, Breath sound equal bilaterally, No wheezes, No rales  Cardiovascular: S1S2, Regular rhythm  Abdomen: Soft, Nontender, Nondistended, No guarding/rebound, Positive bowel sounds  Extremities: No clubbing, No cyanosis, No edema, No calf tenderness, Lower extremity edema  Neuro: Strength equal bilaterally, No tremors  Psychiatric: Appropriate mood, Normal affect      LABORATORY STUDIES:  ----------------------------------------             8.0    10.9  )-----------( 227      ( 27 May 2018 07:36 )             25.1     05-27    144  |  108<H>  |  39.0<H>  ----------------------------<  109  3.8   |  21.0<L>  |  1.52<H>    Ca    8.0<L>      27 May 2018 07:36  Phos  3.1       Mg     2.2         TPro  5.5<L>  /  Alb  2.7<L>  /  TBili  0.6  /  DBili  x   /  AST  36  /  ALT  8   /  AlkPhos  130<H>      LIVER FUNCTIONS - ( 26 May 2018 11:57 )  Alb: 2.7 g/dL / Pro: 5.5 g/dL / ALK PHOS: 130 U/L / ALT: 8 U/L / AST: 36 U/L / GGT: x           PT/INR - ( 26 May 2018 11:57 )   PT: 13.3 sec;   INR: 1.20 ratio    PTT - ( 26 May 2018 11:57 )  PTT:28.4 sec    CARDIAC MARKERS ( 26 May 2018 11:57 )  x     / 0.23 ng/mL / 622 U/L / x     / 16.0 ng/mL    Urinalysis Basic - ( 26 May 2018 22:42 )  Color: Yellow / Appearance: Bloody / S.010 / pH: x  Gluc: x / Ketone: Negative  / Bili: Negative / Urobili: Negative mg/dL   Blood: x / Protein: 100 mg/dL / Nitrite: Negative   Leuk Esterase: Small / RBC: 25-50 /HPF / WBC 0-2   Sq Epi: x / Non Sq Epi: Occasional / Bacteria: Occasional    MEDICATIONS  (STANDING):  atorvastatin 80 milliGRAM(s) Oral at bedtime  donepezil 5 milliGRAM(s) Oral at bedtime  memantine 10 milliGRAM(s) Oral daily  piperacillin/tazobactam IVPB. 3.375 Gram(s) IV Intermittent every 8 hours  QUEtiapine 50 milliGRAM(s) Oral daily  QUEtiapine 100 milliGRAM(s) Oral at bedtime  saccharomyces boulardii 250 milliGRAM(s) Oral two times a day  sodium chloride 0.9%. 1000 milliLiter(s) (150 mL/Hr) IV Continuous <Continuous>      ASSESSMENT / PLAN:  ----------------------------------------  Sepsis / Urinary tract infection - On piperacillin/tazobactam. Urine culture results to be followed.    Urine retention - Urine catheter in place. Hematuria improved with continuous bladder irrigation. Few clots noted. Blood pressure improved, to start on tamsulosin. Constant observation as the patient had a history of pulling out medical devices.    Acute kidney injury - Urinary catheter in place and draining freely. Repeat laboratory studies with improvement in renal function.    Toxic metabolic encephalopathy / Dementia - On donepezil memantine, and quetiapine. CT of the head is pending.    Hypertension - Antihypertensive medications to be held until the blood pressure has improved.    Lower extremity edema - Lower extremity doppler was without DVT. Furosemide held on admission due to hypotension and acute kidney injury.    CAD - On atorvastatin.    MOLST form reviewed with DNR/DNI noted.

## 2018-05-28 LAB
ANION GAP SERPL CALC-SCNC: 12 MMOL/L — SIGNIFICANT CHANGE UP (ref 5–17)
BASE EXCESS BLDA CALC-SCNC: -4.3 MMOL/L — LOW (ref -2–2)
BLOOD GAS COMMENTS ARTERIAL: SIGNIFICANT CHANGE UP
BUN SERPL-MCNC: 22 MG/DL — HIGH (ref 8–20)
CALCIUM SERPL-MCNC: 7.9 MG/DL — LOW (ref 8.6–10.2)
CHLORIDE SERPL-SCNC: 114 MMOL/L — HIGH (ref 98–107)
CO2 SERPL-SCNC: 22 MMOL/L — SIGNIFICANT CHANGE UP (ref 22–29)
CREAT SERPL-MCNC: 0.92 MG/DL — SIGNIFICANT CHANGE UP (ref 0.5–1.3)
CULTURE RESULTS: NO GROWTH — SIGNIFICANT CHANGE UP
GAS PNL BLDA: SIGNIFICANT CHANGE UP
GLUCOSE SERPL-MCNC: 132 MG/DL — HIGH (ref 70–115)
HCO3 BLDA-SCNC: 21 MMOL/L — SIGNIFICANT CHANGE UP (ref 20–26)
HCT VFR BLD CALC: 25 % — LOW (ref 42–52)
HGB BLD-MCNC: 8 G/DL — LOW (ref 14–18)
HOROWITZ INDEX BLDA+IHG-RTO: SIGNIFICANT CHANGE UP
MCHC RBC-ENTMCNC: 29.9 PG — SIGNIFICANT CHANGE UP (ref 27–31)
MCHC RBC-ENTMCNC: 32 G/DL — SIGNIFICANT CHANGE UP (ref 32–36)
MCV RBC AUTO: 93.3 FL — SIGNIFICANT CHANGE UP (ref 80–94)
NT-PROBNP SERPL-SCNC: HIGH PG/ML (ref 0–300)
PCO2 BLDA: 46 MMHG — HIGH (ref 35–45)
PH BLDA: 7.29 — LOW (ref 7.35–7.45)
PLATELET # BLD AUTO: 228 K/UL — SIGNIFICANT CHANGE UP (ref 150–400)
PO2 BLDA: 74 MMHG — LOW (ref 83–108)
POTASSIUM SERPL-MCNC: 3.8 MMOL/L — SIGNIFICANT CHANGE UP (ref 3.5–5.3)
POTASSIUM SERPL-SCNC: 3.8 MMOL/L — SIGNIFICANT CHANGE UP (ref 3.5–5.3)
RBC # BLD: 2.68 M/UL — LOW (ref 4.6–6.2)
RBC # FLD: 14.2 % — SIGNIFICANT CHANGE UP (ref 11–15.6)
SAO2 % BLDA: 92 % — LOW (ref 95–99)
SODIUM SERPL-SCNC: 148 MMOL/L — HIGH (ref 135–145)
SPECIMEN SOURCE: SIGNIFICANT CHANGE UP
WBC # BLD: 8.5 K/UL — SIGNIFICANT CHANGE UP (ref 4.8–10.8)
WBC # FLD AUTO: 8.5 K/UL — SIGNIFICANT CHANGE UP (ref 4.8–10.8)

## 2018-05-28 PROCEDURE — 99233 SBSQ HOSP IP/OBS HIGH 50: CPT

## 2018-05-28 PROCEDURE — 71045 X-RAY EXAM CHEST 1 VIEW: CPT | Mod: 26

## 2018-05-28 PROCEDURE — 99231 SBSQ HOSP IP/OBS SF/LOW 25: CPT

## 2018-05-28 RX ORDER — FUROSEMIDE 40 MG
40 TABLET ORAL ONCE
Qty: 0 | Refills: 0 | Status: COMPLETED | OUTPATIENT
Start: 2018-05-28 | End: 2018-05-28

## 2018-05-28 RX ORDER — METOPROLOL TARTRATE 50 MG
50 TABLET ORAL DAILY
Qty: 0 | Refills: 0 | Status: DISCONTINUED | OUTPATIENT
Start: 2018-05-28 | End: 2018-06-08

## 2018-05-28 RX ORDER — ALBUTEROL 90 UG/1
2.5 AEROSOL, METERED ORAL ONCE
Qty: 0 | Refills: 0 | Status: COMPLETED | OUTPATIENT
Start: 2018-05-28 | End: 2018-05-28

## 2018-05-28 RX ORDER — IPRATROPIUM/ALBUTEROL SULFATE 18-103MCG
3 AEROSOL WITH ADAPTER (GRAM) INHALATION EVERY 6 HOURS
Qty: 0 | Refills: 0 | Status: DISCONTINUED | OUTPATIENT
Start: 2018-05-28 | End: 2018-06-02

## 2018-05-28 RX ORDER — TAMSULOSIN HYDROCHLORIDE 0.4 MG/1
0.8 CAPSULE ORAL AT BEDTIME
Qty: 0 | Refills: 0 | Status: DISCONTINUED | OUTPATIENT
Start: 2018-05-28 | End: 2018-06-08

## 2018-05-28 RX ADMIN — Medication 40 MILLIGRAM(S): at 22:41

## 2018-05-28 RX ADMIN — ATORVASTATIN CALCIUM 80 MILLIGRAM(S): 80 TABLET, FILM COATED ORAL at 22:12

## 2018-05-28 RX ADMIN — Medication 50 MILLIGRAM(S): at 14:04

## 2018-05-28 RX ADMIN — QUETIAPINE FUMARATE 50 MILLIGRAM(S): 200 TABLET, FILM COATED ORAL at 14:04

## 2018-05-28 RX ADMIN — DONEPEZIL HYDROCHLORIDE 5 MILLIGRAM(S): 10 TABLET, FILM COATED ORAL at 22:12

## 2018-05-28 RX ADMIN — Medication 3 MILLILITER(S): at 15:13

## 2018-05-28 RX ADMIN — PIPERACILLIN AND TAZOBACTAM 25 GRAM(S): 4; .5 INJECTION, POWDER, LYOPHILIZED, FOR SOLUTION INTRAVENOUS at 05:54

## 2018-05-28 RX ADMIN — Medication 250 MILLIGRAM(S): at 14:05

## 2018-05-28 RX ADMIN — PIPERACILLIN AND TAZOBACTAM 25 GRAM(S): 4; .5 INJECTION, POWDER, LYOPHILIZED, FOR SOLUTION INTRAVENOUS at 14:04

## 2018-05-28 RX ADMIN — ALBUTEROL 2.5 MILLIGRAM(S): 90 AEROSOL, METERED ORAL at 21:40

## 2018-05-28 RX ADMIN — TAMSULOSIN HYDROCHLORIDE 0.8 MILLIGRAM(S): 0.4 CAPSULE ORAL at 22:12

## 2018-05-28 RX ADMIN — PIPERACILLIN AND TAZOBACTAM 25 GRAM(S): 4; .5 INJECTION, POWDER, LYOPHILIZED, FOR SOLUTION INTRAVENOUS at 22:12

## 2018-05-28 RX ADMIN — MEMANTINE HYDROCHLORIDE 10 MILLIGRAM(S): 10 TABLET ORAL at 14:04

## 2018-05-28 RX ADMIN — QUETIAPINE FUMARATE 100 MILLIGRAM(S): 200 TABLET, FILM COATED ORAL at 22:12

## 2018-05-28 RX ADMIN — Medication 3 MILLILITER(S): at 20:36

## 2018-05-28 RX ADMIN — Medication 250 MILLIGRAM(S): at 05:55

## 2018-05-28 NOTE — CHART NOTE - NSCHARTNOTEFT_GEN_A_CORE
Called to see pt, pt on IV fluids 150cc/hr. Wheezing started today. IVFs stopped and pt started on neb treatments during the day. Pt on lasix po daily at home. being held for ALBERTO. Pt now R 24 PO 93% using accessory muscles. H/O AMS . unable to give HPI  VS B/P 151/82 R 24 PO 92% T 98.5  general alert breathing slightly labored audible wheeze  Lungs + wheeze  Heart RR  abd +bs soft  Stat ABG  Stat CXR   Stat BNP [id number]  CXR + congestion,-- ABG PH 7.29 PCO2 46 PO2 74 O2 SAT92 HCO3 21  lasix 40 mg IVP  Albuterol neb txment  ABG reviewed with Dr Fonseca. BIPAP ordered  continue to monitor  Call PA if any changes in pts condition

## 2018-05-28 NOTE — PROGRESS NOTE ADULT - SUBJECTIVE AND OBJECTIVE BOX
ABBY YOUNG  ----------------------------------------  The patient was seen and evaluated for urine retention.  The patient is in no acute distress.  More awake and alert today. Disoriented.    Vital Signs Last 24 Hrs  T(C): 36.6 (28 May 2018 00:04), Max: 36.6 (28 May 2018 00:04)  T(F): 97.9 (28 May 2018 00:04), Max: 97.9 (28 May 2018 00:04)  HR: 95 (28 May 2018 00:04) (79 - 95)  BP: 145/64 (28 May 2018 00:04) (133/80 - 148/77)  BP(mean): --  RR: 22 (28 May 2018 00:04) (18 - 22)  SpO2: 92% (28 May 2018 00:04) (92% - 99%)    PHYSICAL EXAMINATION:  ----------------------------------------  General appearance: No acute distress, Awake, Alert  HEENT: Normocephalic, Atraumatic, Conjunctiva clear, EOMI  Neck: Supple, No JVD, No tenderness  Lungs: Clear to auscultation, Breath sound equal bilaterally, No wheezes, No rales  Cardiovascular: S1S2, Regular rhythm  Abdomen: Soft, Nontender, Nondistended, No guarding/rebound, Positive bowel sounds  Extremities: No clubbing, No cyanosis, No edema, No calf tenderness, Lower extremity edema  Neuro: Strength equal bilaterally, No tremors  Psychiatric: Appropriate mood, Normal affect, Disoriented    LABORATORY STUDIES:  ----------------------------------------             8.0    10.9  )-----------( 227      ( 27 May 2018 07:36 )             25.1     05-27    144  |  108<H>  |  39.0<H>  ----------------------------<  109  3.8   |  21.0<L>  |  1.52<H>    Ca    8.0<L>      27 May 2018 07:36  Phos  3.1       Mg     2.2         TPro  5.5<L>  /  Alb  2.7<L>  /  TBili  0.6  /  DBili  x   /  AST  36  /  ALT  8   /  AlkPhos  130<H>      LIVER FUNCTIONS - ( 26 May 2018 11:57 )  Alb: 2.7 g/dL / Pro: 5.5 g/dL / ALK PHOS: 130 U/L / ALT: 8 U/L / AST: 36 U/L / GGT: x           PT/INR - ( 26 May 2018 11:57 )   PT: 13.3 sec;   INR: 1.20 ratio    PTT - ( 26 May 2018 11:57 )  PTT:28.4 sec    CARDIAC MARKERS ( 26 May 2018 11:57 )  x     / 0.23 ng/mL / 622 U/L / x     / 16.0 ng/mL    Urinalysis Basic - ( 26 May 2018 22:42 )  Color: Yellow / Appearance: Bloody / S.010 / pH: x  Gluc: x / Ketone: Negative  / Bili: Negative / Urobili: Negative mg/dL   Blood: x / Protein: 100 mg/dL / Nitrite: Negative   Leuk Esterase: Small / RBC: 25-50 /HPF / WBC 0-2   Sq Epi: x / Non Sq Epi: Occasional / Bacteria: Occasional    MEDICATIONS  (STANDING):  atorvastatin 80 milliGRAM(s) Oral at bedtime  donepezil 5 milliGRAM(s) Oral at bedtime  memantine 10 milliGRAM(s) Oral daily  metoprolol succinate ER 50 milliGRAM(s) Oral daily  piperacillin/tazobactam IVPB. 3.375 Gram(s) IV Intermittent every 8 hours  QUEtiapine 50 milliGRAM(s) Oral daily  QUEtiapine 100 milliGRAM(s) Oral at bedtime  saccharomyces boulardii 250 milliGRAM(s) Oral two times a day  tamsulosin 0.8 milliGRAM(s) Oral at bedtime      ASSESSMENT / PLAN:  ----------------------------------------  Sepsis / Urinary tract infection - Afebrile. Resolved leukocytosis. On piperacillin/tazobactam. Urine culture results to be followed.    Urine retention - Urine catheter in place with clear urine. Tamsulosin dose increased. Constant observation as the patient had a history of pulling out medical devices.    Acute kidney injury - Urinary catheter in place and draining freely. Repeat laboratory studies with improvement in renal function.    Toxic metabolic encephalopathy / Dementia - On donepezil memantine, and quetiapine. CT of the head was without acute intracranial pathology.    Hypertension - Antihypertensive medications were initially held but the blood pressure is now improved and metoprolol is to be restarted.    Lower extremity edema - Lower extremity doppler was without DVT. Furosemide held on admission due to hypotension and acute kidney injury.    CAD - On atorvastatin.    Palliative Care consultation pending. ABBY YOUNG  ----------------------------------------  The patient was seen and evaluated for urine retention.  The patient is in no acute distress.  More awake and alert today. Disoriented.    Vital Signs Last 24 Hrs  T(C): 36.6 (28 May 2018 00:04), Max: 36.6 (28 May 2018 00:04)  T(F): 97.9 (28 May 2018 00:04), Max: 97.9 (28 May 2018 00:04)  HR: 95 (28 May 2018 00:04) (79 - 95)  BP: 145/64 (28 May 2018 00:04) (133/80 - 148/77)  BP(mean): --  RR: 22 (28 May 2018 00:04) (18 - 22)  SpO2: 92% (28 May 2018 00:04) (92% - 99%)    PHYSICAL EXAMINATION:  ----------------------------------------  General appearance: No acute distress, Awake, Alert  HEENT: Normocephalic, Atraumatic, Conjunctiva clear, EOMI  Neck: Supple, No JVD, No tenderness  Lungs: Clear to auscultation, Breath sound equal bilaterally, No wheezes, No rales  Cardiovascular: S1S2, Regular rhythm  Abdomen: Soft, Nontender, Nondistended, No guarding/rebound, Positive bowel sounds  Extremities: No clubbing, No cyanosis, No edema, No calf tenderness, Lower extremity edema  Neuro: Strength equal bilaterally, No tremors  Psychiatric: Appropriate mood, Normal affect, Disoriented    LABORATORY STUDIES:  ----------------------------------------             8.0    10.9  )-----------( 227      ( 27 May 2018 07:36 )             25.1     05-27    144  |  108<H>  |  39.0<H>  ----------------------------<  109  3.8   |  21.0<L>  |  1.52<H>    Ca    8.0<L>      27 May 2018 07:36  Phos  3.1       Mg     2.2         TPro  5.5<L>  /  Alb  2.7<L>  /  TBili  0.6  /  DBili  x   /  AST  36  /  ALT  8   /  AlkPhos  130<H>      LIVER FUNCTIONS - ( 26 May 2018 11:57 )  Alb: 2.7 g/dL / Pro: 5.5 g/dL / ALK PHOS: 130 U/L / ALT: 8 U/L / AST: 36 U/L / GGT: x           PT/INR - ( 26 May 2018 11:57 )   PT: 13.3 sec;   INR: 1.20 ratio    PTT - ( 26 May 2018 11:57 )  PTT:28.4 sec    CARDIAC MARKERS ( 26 May 2018 11:57 )  x     / 0.23 ng/mL / 622 U/L / x     / 16.0 ng/mL    Urinalysis Basic - ( 26 May 2018 22:42 )  Color: Yellow / Appearance: Bloody / S.010 / pH: x  Gluc: x / Ketone: Negative  / Bili: Negative / Urobili: Negative mg/dL   Blood: x / Protein: 100 mg/dL / Nitrite: Negative   Leuk Esterase: Small / RBC: 25-50 /HPF / WBC 0-2   Sq Epi: x / Non Sq Epi: Occasional / Bacteria: Occasional    MEDICATIONS  (STANDING):  atorvastatin 80 milliGRAM(s) Oral at bedtime  donepezil 5 milliGRAM(s) Oral at bedtime  memantine 10 milliGRAM(s) Oral daily  metoprolol succinate ER 50 milliGRAM(s) Oral daily  piperacillin/tazobactam IVPB. 3.375 Gram(s) IV Intermittent every 8 hours  QUEtiapine 50 milliGRAM(s) Oral daily  QUEtiapine 100 milliGRAM(s) Oral at bedtime  saccharomyces boulardii 250 milliGRAM(s) Oral two times a day  tamsulosin 0.8 milliGRAM(s) Oral at bedtime      ASSESSMENT / PLAN:  ----------------------------------------  Sepsis / Urinary tract infection - Afebrile. Resolved leukocytosis. On piperacillin/tazobactam. Urine culture results to be followed.    Urine retention - Urine catheter in place with clear urine. To maintain urine catheter for minimum of one week as per Urology recommendations. Tamsulosin dose increased. Constant observation as the patient had a history of pulling out medical devices.    Acute kidney injury - Urinary catheter in place and draining freely. Repeat laboratory studies with improvement in renal function.    Toxic metabolic encephalopathy / Dementia - On donepezil memantine, and quetiapine. CT of the head was without acute intracranial pathology.    Hypertension - Antihypertensive medications were initially held but the blood pressure is now improved and metoprolol is to be restarted.    Lower extremity edema - Lower extremity doppler was without DVT. Furosemide held on admission due to hypotension and acute kidney injury.    CAD - On atorvastatin.    Palliative Care consultation pending.

## 2018-05-29 LAB
ANION GAP SERPL CALC-SCNC: 14 MMOL/L — SIGNIFICANT CHANGE UP (ref 5–17)
BUN SERPL-MCNC: 18 MG/DL — SIGNIFICANT CHANGE UP (ref 8–20)
CALCIUM SERPL-MCNC: 8.1 MG/DL — LOW (ref 8.6–10.2)
CHLORIDE SERPL-SCNC: 111 MMOL/L — HIGH (ref 98–107)
CO2 SERPL-SCNC: 24 MMOL/L — SIGNIFICANT CHANGE UP (ref 22–29)
CREAT SERPL-MCNC: 0.98 MG/DL — SIGNIFICANT CHANGE UP (ref 0.5–1.3)
GLUCOSE SERPL-MCNC: 176 MG/DL — HIGH (ref 70–115)
POTASSIUM SERPL-MCNC: 3.6 MMOL/L — SIGNIFICANT CHANGE UP (ref 3.5–5.3)
POTASSIUM SERPL-SCNC: 3.6 MMOL/L — SIGNIFICANT CHANGE UP (ref 3.5–5.3)
SODIUM SERPL-SCNC: 149 MMOL/L — HIGH (ref 135–145)

## 2018-05-29 PROCEDURE — 99233 SBSQ HOSP IP/OBS HIGH 50: CPT

## 2018-05-29 RX ORDER — FUROSEMIDE 40 MG
20 TABLET ORAL DAILY
Qty: 0 | Refills: 0 | Status: DISCONTINUED | OUTPATIENT
Start: 2018-05-29 | End: 2018-05-30

## 2018-05-29 RX ADMIN — MEMANTINE HYDROCHLORIDE 10 MILLIGRAM(S): 10 TABLET ORAL at 11:39

## 2018-05-29 RX ADMIN — Medication 3 MILLILITER(S): at 20:51

## 2018-05-29 RX ADMIN — PIPERACILLIN AND TAZOBACTAM 25 GRAM(S): 4; .5 INJECTION, POWDER, LYOPHILIZED, FOR SOLUTION INTRAVENOUS at 22:07

## 2018-05-29 RX ADMIN — PIPERACILLIN AND TAZOBACTAM 25 GRAM(S): 4; .5 INJECTION, POWDER, LYOPHILIZED, FOR SOLUTION INTRAVENOUS at 05:59

## 2018-05-29 RX ADMIN — Medication 3 MILLILITER(S): at 14:37

## 2018-05-29 RX ADMIN — Medication 20 MILLIGRAM(S): at 17:19

## 2018-05-29 RX ADMIN — Medication 3 MILLILITER(S): at 03:11

## 2018-05-29 RX ADMIN — ATORVASTATIN CALCIUM 80 MILLIGRAM(S): 80 TABLET, FILM COATED ORAL at 22:08

## 2018-05-29 RX ADMIN — PIPERACILLIN AND TAZOBACTAM 25 GRAM(S): 4; .5 INJECTION, POWDER, LYOPHILIZED, FOR SOLUTION INTRAVENOUS at 15:20

## 2018-05-29 RX ADMIN — QUETIAPINE FUMARATE 50 MILLIGRAM(S): 200 TABLET, FILM COATED ORAL at 11:39

## 2018-05-29 RX ADMIN — Medication 250 MILLIGRAM(S): at 05:59

## 2018-05-29 RX ADMIN — Medication 250 MILLIGRAM(S): at 17:02

## 2018-05-29 RX ADMIN — DONEPEZIL HYDROCHLORIDE 5 MILLIGRAM(S): 10 TABLET, FILM COATED ORAL at 22:08

## 2018-05-29 RX ADMIN — QUETIAPINE FUMARATE 100 MILLIGRAM(S): 200 TABLET, FILM COATED ORAL at 22:08

## 2018-05-29 RX ADMIN — Medication 50 MILLIGRAM(S): at 05:59

## 2018-05-29 RX ADMIN — TAMSULOSIN HYDROCHLORIDE 0.8 MILLIGRAM(S): 0.4 CAPSULE ORAL at 22:08

## 2018-05-29 RX ADMIN — Medication 3 MILLILITER(S): at 09:33

## 2018-05-29 NOTE — PROGRESS NOTE ADULT - SUBJECTIVE AND OBJECTIVE BOX
ABBYLORI VIDAL  ----------------------------------------  The patient was seen and evaluated for sepsis.  The patient is in no acute distress.  Awake but disoriented.    Vital Signs Last 24 Hrs  T(C): 37 (29 May 2018 08:04), Max: 37 (29 May 2018 08:04)  T(F): 98.6 (29 May 2018 08:04), Max: 98.6 (29 May 2018 08:04)  HR: 88 (29 May 2018 08:04) (88 - 109)  BP: 131/79 (29 May 2018 08:04) (131/79 - 161/90)  BP(mean): --  RR: 18 (29 May 2018 08:04) (18 - 24)  SpO2: 100% (29 May 2018 08:04) (93% - 100%)    PHYSICAL EXAMINATION:  ----------------------------------------  General appearance: No acute distress, Awake, Alert  HEENT: Normocephalic, Atraumatic, Conjunctiva clear, EOMI  Neck: Supple, No JVD, No tenderness  Lungs: Clear to auscultation, Breath sound equal bilaterally, No wheezes, No rales  Cardiovascular: S1S2, Regular rhythm  Abdomen: Soft, Nontender, Nondistended, No guarding/rebound, Positive bowel sounds  Extremities: No clubbing, No cyanosis, No edema, No calf tenderness, Lower extremity edema  Neuro: Strength equal bilaterally, No tremors  Psychiatric: Appropriate mood, Normal affect, Disoriented    LABORATORY STUDIES:  ----------------------------------------             8.0    8.5   )-----------( 228      ( 28 May 2018 09:07 )             25.0     05-29    149<H>  |  111<H>  |  18.0  ----------------------------<  176<H>  3.6   |  24.0  |  0.98    Ca    8.1<L>      29 May 2018 08:06    Culture - Urine (collected 26 May 2018 22:41)  Source: .Urine Clean Catch (Midstream)  Final Report (28 May 2018 11:00):    No growth    MEDICATIONS  (STANDING):  ALBUTerol/ipratropium for Nebulization 3 milliLiter(s) Nebulizer every 6 hours  atorvastatin 80 milliGRAM(s) Oral at bedtime  donepezil 5 milliGRAM(s) Oral at bedtime  memantine 10 milliGRAM(s) Oral daily  metoprolol succinate ER 50 milliGRAM(s) Oral daily  piperacillin/tazobactam IVPB. 3.375 Gram(s) IV Intermittent every 8 hours  QUEtiapine 50 milliGRAM(s) Oral daily  QUEtiapine 100 milliGRAM(s) Oral at bedtime  saccharomyces boulardii 250 milliGRAM(s) Oral two times a day  tamsulosin 0.8 milliGRAM(s) Oral at bedtime      ASSESSMENT / PLAN:  ----------------------------------------  Sepsis / Urinary tract infection - Afebrile. Resolved leukocytosis. On piperacillin/tazobactam. Urine culture was without growth.    Urine retention - On tamsulosin. Urine catheter in place with clear urine. To maintain urine catheter for minimum of one week as per Urology recommendations.  Constant observation as the patient had a history of pulling out medical devices.    Acute kidney injury - Urinary catheter in place and draining freely. Repeat laboratory studies with improvement in renal function.    Toxic metabolic encephalopathy / Dementia - Appears improved. On donepezil memantine, and quetiapine. CT of the head was without acute intracranial pathology.    Hypertension - On metoprolol. Close blood pressure monitoring.    Lower extremity edema - Furosemide to be restarted. Lower extremity doppler was without DVT.    CAD - On atorvastatin.    Palliative Care consultation pending. ABBY VIDAL  ----------------------------------------  The patient was seen and evaluated for sepsis.  The patient is in no acute distress.  Awake but disoriented.    Vital Signs Last 24 Hrs  T(C): 37 (29 May 2018 08:04), Max: 37 (29 May 2018 08:04)  T(F): 98.6 (29 May 2018 08:04), Max: 98.6 (29 May 2018 08:04)  HR: 88 (29 May 2018 08:04) (88 - 109)  BP: 131/79 (29 May 2018 08:04) (131/79 - 161/90)  BP(mean): --  RR: 18 (29 May 2018 08:04) (18 - 24)  SpO2: 100% (29 May 2018 08:04) (93% - 100%)    PHYSICAL EXAMINATION:  ----------------------------------------  General appearance: No acute distress, Awake, Alert  HEENT: Normocephalic, Atraumatic, Conjunctiva clear, EOMI  Neck: Supple, No JVD, No tenderness  Lungs: Clear to auscultation, Breath sound equal bilaterally, No wheezes, No rales  Cardiovascular: S1S2, Regular rhythm  Abdomen: Soft, Nontender, Nondistended, No guarding/rebound, Positive bowel sounds  Extremities: No clubbing, No cyanosis, No calf tenderness, Lower extremity edema  Neuro: Strength equal bilaterally, No tremors  Psychiatric: Appropriate mood, Normal affect, Disoriented    LABORATORY STUDIES:  ----------------------------------------             8.0    8.5   )-----------( 228      ( 28 May 2018 09:07 )             25.0     05-29    149<H>  |  111<H>  |  18.0  ----------------------------<  176<H>  3.6   |  24.0  |  0.98    Ca    8.1<L>      29 May 2018 08:06    Culture - Urine (collected 26 May 2018 22:41)  Source: .Urine Clean Catch (Midstream)  Final Report (28 May 2018 11:00):    No growth    MEDICATIONS  (STANDING):  ALBUTerol/ipratropium for Nebulization 3 milliLiter(s) Nebulizer every 6 hours  atorvastatin 80 milliGRAM(s) Oral at bedtime  donepezil 5 milliGRAM(s) Oral at bedtime  memantine 10 milliGRAM(s) Oral daily  metoprolol succinate ER 50 milliGRAM(s) Oral daily  piperacillin/tazobactam IVPB. 3.375 Gram(s) IV Intermittent every 8 hours  QUEtiapine 50 milliGRAM(s) Oral daily  QUEtiapine 100 milliGRAM(s) Oral at bedtime  saccharomyces boulardii 250 milliGRAM(s) Oral two times a day  tamsulosin 0.8 milliGRAM(s) Oral at bedtime      ASSESSMENT / PLAN:  ----------------------------------------  Sepsis / Urinary tract infection - Afebrile. Resolved leukocytosis. On piperacillin/tazobactam. Urine culture was without growth.    Urine retention - On tamsulosin. Urine catheter in place with clear urine. To maintain urine catheter for minimum of one week as per Urology recommendations.  Constant observation as the patient had a history of pulling out medical devices.    Acute kidney injury - Urinary catheter in place and draining freely. Repeat laboratory studies with improvement in renal function.    Toxic metabolic encephalopathy / Dementia - Appears improved. On donepezil memantine, and quetiapine. CT of the head was without acute intracranial pathology.    Hypertension - On metoprolol. Close blood pressure monitoring.    Lower extremity edema - Furosemide to be restarted. Lower extremity doppler was without DVT.    CAD - On atorvastatin.    Palliative Care consultation pending.

## 2018-05-30 DIAGNOSIS — I50.9 HEART FAILURE, UNSPECIFIED: ICD-10-CM

## 2018-05-30 DIAGNOSIS — S72.009A FRACTURE OF UNSPECIFIED PART OF NECK OF UNSPECIFIED FEMUR, INITIAL ENCOUNTER FOR CLOSED FRACTURE: ICD-10-CM

## 2018-05-30 DIAGNOSIS — F03.90 UNSPECIFIED DEMENTIA WITHOUT BEHAVIORAL DISTURBANCE: ICD-10-CM

## 2018-05-30 DIAGNOSIS — Z51.5 ENCOUNTER FOR PALLIATIVE CARE: ICD-10-CM

## 2018-05-30 DIAGNOSIS — R53.2 FUNCTIONAL QUADRIPLEGIA: ICD-10-CM

## 2018-05-30 DIAGNOSIS — N39.0 URINARY TRACT INFECTION, SITE NOT SPECIFIED: ICD-10-CM

## 2018-05-30 LAB
ANION GAP SERPL CALC-SCNC: 13 MMOL/L — SIGNIFICANT CHANGE UP (ref 5–17)
BUN SERPL-MCNC: 15 MG/DL — SIGNIFICANT CHANGE UP (ref 8–20)
CALCIUM SERPL-MCNC: 8.2 MG/DL — LOW (ref 8.6–10.2)
CHLORIDE SERPL-SCNC: 110 MMOL/L — HIGH (ref 98–107)
CO2 SERPL-SCNC: 24 MMOL/L — SIGNIFICANT CHANGE UP (ref 22–29)
CREAT SERPL-MCNC: 0.88 MG/DL — SIGNIFICANT CHANGE UP (ref 0.5–1.3)
GLUCOSE SERPL-MCNC: 159 MG/DL — HIGH (ref 70–115)
POTASSIUM SERPL-MCNC: 3.5 MMOL/L — SIGNIFICANT CHANGE UP (ref 3.5–5.3)
POTASSIUM SERPL-SCNC: 3.5 MMOL/L — SIGNIFICANT CHANGE UP (ref 3.5–5.3)
SODIUM SERPL-SCNC: 147 MMOL/L — HIGH (ref 135–145)

## 2018-05-30 PROCEDURE — 99223 1ST HOSP IP/OBS HIGH 75: CPT

## 2018-05-30 PROCEDURE — 99233 SBSQ HOSP IP/OBS HIGH 50: CPT

## 2018-05-30 RX ORDER — FUROSEMIDE 40 MG
40 TABLET ORAL ONCE
Qty: 0 | Refills: 0 | Status: COMPLETED | OUTPATIENT
Start: 2018-05-30 | End: 2018-05-30

## 2018-05-30 RX ORDER — FUROSEMIDE 40 MG
40 TABLET ORAL DAILY
Qty: 0 | Refills: 0 | Status: DISCONTINUED | OUTPATIENT
Start: 2018-05-31 | End: 2018-06-08

## 2018-05-30 RX ADMIN — QUETIAPINE FUMARATE 50 MILLIGRAM(S): 200 TABLET, FILM COATED ORAL at 11:27

## 2018-05-30 RX ADMIN — Medication 3 MILLILITER(S): at 04:01

## 2018-05-30 RX ADMIN — Medication 3 MILLILITER(S): at 20:48

## 2018-05-30 RX ADMIN — Medication 3 MILLILITER(S): at 08:27

## 2018-05-30 RX ADMIN — Medication 50 MILLIGRAM(S): at 05:29

## 2018-05-30 RX ADMIN — Medication 250 MILLIGRAM(S): at 17:04

## 2018-05-30 RX ADMIN — PIPERACILLIN AND TAZOBACTAM 25 GRAM(S): 4; .5 INJECTION, POWDER, LYOPHILIZED, FOR SOLUTION INTRAVENOUS at 13:56

## 2018-05-30 RX ADMIN — QUETIAPINE FUMARATE 100 MILLIGRAM(S): 200 TABLET, FILM COATED ORAL at 21:28

## 2018-05-30 RX ADMIN — PIPERACILLIN AND TAZOBACTAM 25 GRAM(S): 4; .5 INJECTION, POWDER, LYOPHILIZED, FOR SOLUTION INTRAVENOUS at 21:28

## 2018-05-30 RX ADMIN — TAMSULOSIN HYDROCHLORIDE 0.8 MILLIGRAM(S): 0.4 CAPSULE ORAL at 21:28

## 2018-05-30 RX ADMIN — Medication 250 MILLIGRAM(S): at 05:29

## 2018-05-30 RX ADMIN — DONEPEZIL HYDROCHLORIDE 5 MILLIGRAM(S): 10 TABLET, FILM COATED ORAL at 21:28

## 2018-05-30 RX ADMIN — MEMANTINE HYDROCHLORIDE 10 MILLIGRAM(S): 10 TABLET ORAL at 11:27

## 2018-05-30 RX ADMIN — Medication 3 MILLILITER(S): at 14:57

## 2018-05-30 RX ADMIN — Medication 40 MILLIGRAM(S): at 10:34

## 2018-05-30 RX ADMIN — PIPERACILLIN AND TAZOBACTAM 25 GRAM(S): 4; .5 INJECTION, POWDER, LYOPHILIZED, FOR SOLUTION INTRAVENOUS at 05:28

## 2018-05-30 RX ADMIN — Medication 20 MILLIGRAM(S): at 05:29

## 2018-05-30 RX ADMIN — ATORVASTATIN CALCIUM 80 MILLIGRAM(S): 80 TABLET, FILM COATED ORAL at 21:28

## 2018-05-30 NOTE — CONSULT NOTE ADULT - ASSESSMENT
84yr man, hx of Lewy Body dementia, transferred from [facility] for further tx,. Patient with recent fall sustaining hip fracture with IMN.  Patient now appears to have decline in functional status.

## 2018-05-30 NOTE — CONSULT NOTE ADULT - PROBLEM SELECTOR RECOMMENDATION 6
Patient with MOLST- DNR/I  Met with wife. She informs me patient with declining functional status related to his dementia, especially after he fractured his right hip 1 yr ago.   She feels this hospitalization, there is a significant decline and doubts he will be able to recuperate. Explained to her that often, after a hospitalization, patient's frail and with his dementia, do not gain back their baseline functional ability.  She feels that he will  not survive this hospitalization.  Informed her if he continues to decline and all medical efforts are not achieving significant benefit, can elect comfort care.   However, if he is able to be discharged, she would like him to go to  [facility].  Informed her, once in the rehab, if he continues to decline, can consider comfort measures there and  " no rehospitalization".    Wife can also take him home with hospice if approved.   Will continue to monitor patient's hospital course and see where his care would best be served. Will meet again with wife.

## 2018-05-30 NOTE — CONSULT NOTE ADULT - SUBJECTIVE AND OBJECTIVE BOX
Palliative Medicine Initial Consultation Note  HPI:     84M with a prior admission to [facility] for a hip fracture and subsequent transfer to [facility] who was found to have altered mental status and urine retention. The patient was transferred to [facility] for further management. Early catheter placement was attempted with resulting hematuria and inability to pass the catheter. The patient was transferred to [facility] for further treatment    PERTINENT PMH REVIEWED:  [x ] YES [ ] NO      Anxiety    Asthma    Dementia    High cholesterol    HTN (hypertension)    MI (myocardial infarction)    Prostate CA.    SOCIAL HISTORY:  EtOH [ ] Yes  [ x] No                                    Drugs [ ] Yes [ x] No                                   [ ] smoker [ x] nonsmoker                                    Admitted from: [facility]    Surrogate/HCP/Guardian: wife    FAMILY HISTORY:  No pertinent family history in first degree relatives      Baseline ADLs (prior to admission):  Independent [ ] moderately [ ] fully   Dependent   [ x] moderately [ ]fully    MEDICATIONS  (STANDING):  ALBUTerol/ipratropium for Nebulization 3 milliLiter(s) Nebulizer every 6 hours  atorvastatin 80 milliGRAM(s) Oral at bedtime  donepezil 5 milliGRAM(s) Oral at bedtime  memantine 10 milliGRAM(s) Oral daily  metoprolol succinate ER 50 milliGRAM(s) Oral daily  piperacillin/tazobactam IVPB. 3.375 Gram(s) IV Intermittent every 8 hours  QUEtiapine 50 milliGRAM(s) Oral daily  QUEtiapine 100 milliGRAM(s) Oral at bedtime  saccharomyces boulardii 250 milliGRAM(s) Oral two times a day  tamsulosin 0.8 milliGRAM(s) Oral at bedtime    MEDICATIONS  (PRN):      Allergies    No Known Allergies    Intolerances        REVIEW OF SYSTEMS       [ x] Unable to obtain due to poor mentation       Karnofsky Performance Score/Palliative Performance Status Version 2:  30  %    Vital Signs Last 24 Hrs  T(C): 37 (30 May 2018 16:12), Max: 37.2 (30 May 2018 00:08)  T(F): 98.6 (30 May 2018 16:12), Max: 99 (30 May 2018 00:08)  HR: 78 (30 May 2018 16:12) (78 - 99)  BP: 142/77 (30 May 2018 16:12) (142/77 - 150/91)  BP(mean): --  RR: 18 (30 May 2018 16:12) (18 - 20)  SpO2: 98% (30 May 2018 16:12) (91% - 98%)    PHYSICAL EXAM:    General: Sleeping, arousable to voice    HEENT: [x ] normal  [ ] dry mouth  [ ] ET tube/trach    Lungs: [x ] comfortable [ ] tachypnea/labored breathing  [ ] excessive secretions    CV: [x ] normal  [ ] tachycardia    GI: [x ] normal  [ ] distended  [ ] tender  [ ] no BS               [ ] PEG/NG/OG tube    : [ ] normal  [ ] incontinent  [ ] oliguria/anuria  [x ] early    MSK: [ ] normal  [x ] weakness  [ ] edema             [ ] ambulatory  [ ] bedbound/wheelchair bound    Skin: [ ] normal  [ ] pressure ulcers- Stage_____  [x ] no rash    LABS:    05-30    147<H>  |  110<H>  |  15.0  ----------------------------<  159<H>  3.5   |  24.0  |  0.88    Ca    8.2<L>      30 May 2018 07:55          I&O's Summary    29 May 2018 07:01  -  30 May 2018 07:00  --------------------------------------------------------  IN: 2200 mL / OUT: 2460 mL / NET: -260 mL    30 May 2018 07:01  -  30 May 2018 17:14  --------------------------------------------------------  IN: 0 mL / OUT: 1200 mL / NET: -1200 mL        RADIOLOGY & ADDITIONAL STUDIES:  < from: Xray Chest 1 View- PORTABLE-Urgent (05.28.18 @ 23:14) >  EXAM:  XR CHEST PORTABLE URGENT 1V                          PROCEDURE DATE:  05/28/2018          INTERPRETATION:  Portable chest radiograph dated 5/28/2018.    COMPARISON: 5/26/2018.    CLINICAL INFORMATION: Tachypnea.    FINDINGS:    The airway is midline.  Interval development of bilateral pulmonary edema, and bilateral pleural   effusions.  There are compressive atelectatic changes in the lower lobes.  Cardiomegaly is again noted.  The bones are normal.     IMPRESSION:  Findings are indicative of interval development of CHF, for which   clinical correlation is recommended.        < end of copied text >    ASSESSMENT and PLAN:    ADVANCE DIRECTIVES:  [x ] YES [ ] NO   DNR [x ] YES [ ] NO  Completed on:                     MOLST  [x ] YES [ ] NO   Completed on:  Living Will  [ ] YES [x ] NO   Completed on:      Thank you for the opportunity to assist with the care of this patient.   [place] [facility] Consult [facility] [phone].

## 2018-05-30 NOTE — PROGRESS NOTE ADULT - SUBJECTIVE AND OBJECTIVE BOX
ABBY VIDAL  ----------------------------------------  The patient was seen and evaluated for hematuria and urine retention.  The patient is in no acute distress.  Denied any chest pain, palpitations, dyspnea, or abdominal pain.  Disoriented, poor appetite.    Vital Signs Last 24 Hrs  T(C): 37.1 (30 May 2018 09:29), Max: 37.2 (30 May 2018 00:08)  T(F): 98.8 (30 May 2018 09:29), Max: 99 (30 May 2018 00:08)  HR: 99 (30 May 2018 09:29) (64 - 99)  BP: 145/87 (30 May 2018 09:29) (128/75 - 150/91)  BP(mean): --  RR: 18 (30 May 2018 09:29) (18 - 20)  SpO2: 91% (30 May 2018 09:29) (91% - 97%)    PHYSICAL EXAMINATION:  ----------------------------------------  General appearance: No acute distress, Awake, Alert  HEENT: Normocephalic, Atraumatic, Conjunctiva clear, EOMI  Neck: Supple, No JVD, No tenderness  Lungs: Clear to auscultation, Breath sound equal bilaterally, No rales, Mild wheezes  Cardiovascular: S1S2, Regular rhythm  Abdomen: Soft, Nontender, Nondistended, No guarding/rebound, Positive bowel sounds  Extremities: No clubbing, No cyanosis, No calf tenderness, Mild lower extremity edema  Neuro: Strength equal bilaterally, No tremors  Psychiatric: Appropriate mood, Normal affect, Disoriented    LABORATORY STUDIES:  ----------------------------------------  05-30    147<H>  |  110<H>  |  15.0  ----------------------------<  159<H>  3.5   |  24.0  |  0.88    Ca    8.2<L>      30 May 2018 07:55    MEDICATIONS  (STANDING):  ALBUTerol/ipratropium for Nebulization 3 milliLiter(s) Nebulizer every 6 hours  atorvastatin 80 milliGRAM(s) Oral at bedtime  donepezil 5 milliGRAM(s) Oral at bedtime  furosemide    Tablet 20 milliGRAM(s) Oral daily  furosemide   Injectable 40 milliGRAM(s) IV Push once  memantine 10 milliGRAM(s) Oral daily  metoprolol succinate ER 50 milliGRAM(s) Oral daily  piperacillin/tazobactam IVPB. 3.375 Gram(s) IV Intermittent every 8 hours  QUEtiapine 50 milliGRAM(s) Oral daily  QUEtiapine 100 milliGRAM(s) Oral at bedtime  saccharomyces boulardii 250 milliGRAM(s) Oral two times a day  tamsulosin 0.8 milliGRAM(s) Oral at bedtime      ASSESSMENT / PLAN:  ----------------------------------------  Sepsis / Urinary tract infection - Continues to be afebrile. On piperacillin/tazobactam.    Urine retention - On tamsulosin with urine catheter in place. Draining clear urine. To maintain urine catheter for minimum of one week as per Urology recommendations.  Constant observation as the patient continues to pull at devices.    Acute kidney injury - Repeat laboratory studies with improvement in renal function.    Toxic metabolic encephalopathy / Dementia - On donepezil memantine, and quetiapine. CT of the head was without acute intracranial pathology.    Hypertension - On metoprolol and furosemide. Close blood pressure monitoring.    Lower extremity edema - Furosemide to be restarted. Lower extremity doppler was without DVT.    CAD - On atorvastatin. ABBY VIDAL  ----------------------------------------  The patient was seen and evaluated for hematuria and urine retention.  The patient is in no acute distress.  Denied any chest pain, palpitations, dyspnea, or abdominal pain.  Disoriented, poor appetite.    Vital Signs Last 24 Hrs  T(C): 37.1 (30 May 2018 09:29), Max: 37.2 (30 May 2018 00:08)  T(F): 98.8 (30 May 2018 09:29), Max: 99 (30 May 2018 00:08)  HR: 99 (30 May 2018 09:29) (64 - 99)  BP: 145/87 (30 May 2018 09:29) (128/75 - 150/91)  BP(mean): --  RR: 18 (30 May 2018 09:29) (18 - 20)  SpO2: 91% (30 May 2018 09:29) (91% - 97%)    PHYSICAL EXAMINATION:  ----------------------------------------  General appearance: No acute distress, Awake, Alert  HEENT: Normocephalic, Atraumatic, Conjunctiva clear, EOMI  Neck: Supple, No JVD, No tenderness  Lungs: Clear to auscultation, Breath sound equal bilaterally, No rales, Mild wheezes  Cardiovascular: S1S2, Regular rhythm  Abdomen: Soft, Nontender, Nondistended, No guarding/rebound, Positive bowel sounds  Extremities: No clubbing, No cyanosis, No calf tenderness, Mild lower extremity edema  Neuro: Strength equal bilaterally, No tremors  Psychiatric: Appropriate mood, Normal affect, Disoriented    LABORATORY STUDIES:  ----------------------------------------  05-30    147<H>  |  110<H>  |  15.0  ----------------------------<  159<H>  3.5   |  24.0  |  0.88    Ca    8.2<L>      30 May 2018 07:55    MEDICATIONS  (STANDING):  ALBUTerol/ipratropium for Nebulization 3 milliLiter(s) Nebulizer every 6 hours  atorvastatin 80 milliGRAM(s) Oral at bedtime  donepezil 5 milliGRAM(s) Oral at bedtime  furosemide    Tablet 20 milliGRAM(s) Oral daily  furosemide   Injectable 40 milliGRAM(s) IV Push once  memantine 10 milliGRAM(s) Oral daily  metoprolol succinate ER 50 milliGRAM(s) Oral daily  piperacillin/tazobactam IVPB. 3.375 Gram(s) IV Intermittent every 8 hours  QUEtiapine 50 milliGRAM(s) Oral daily  QUEtiapine 100 milliGRAM(s) Oral at bedtime  saccharomyces boulardii 250 milliGRAM(s) Oral two times a day  tamsulosin 0.8 milliGRAM(s) Oral at bedtime      ASSESSMENT / PLAN:  ----------------------------------------  Sepsis / Urinary tract infection - Continues to be afebrile. On piperacillin/tazobactam.    Urine retention - On tamsulosin with urine catheter in place. Draining clear urine. To maintain urine catheter for minimum of one week as per Urology recommendations.  Constant observation as the patient continues to pull at devices.    Acute kidney injury - Repeat laboratory studies with improvement in renal function.    Toxic metabolic encephalopathy / Dementia - On donepezil memantine, and quetiapine. CT of the head was without acute intracranial pathology.    Hypertension - On metoprolol. Close blood pressure monitoring.    Hypoxia - Oxygen saturation was 89% on ambient air. Additional furosemide for diuresis.    Lower extremity edema - Furosemide to be restarted. Lower extremity doppler was without DVT.    CAD - On atorvastatin.

## 2018-05-31 LAB
ANION GAP SERPL CALC-SCNC: 13 MMOL/L — SIGNIFICANT CHANGE UP (ref 5–17)
BUN SERPL-MCNC: 14 MG/DL — SIGNIFICANT CHANGE UP (ref 8–20)
CALCIUM SERPL-MCNC: 8.3 MG/DL — LOW (ref 8.6–10.2)
CHLORIDE SERPL-SCNC: 109 MMOL/L — HIGH (ref 98–107)
CO2 SERPL-SCNC: 26 MMOL/L — SIGNIFICANT CHANGE UP (ref 22–29)
CREAT SERPL-MCNC: 0.84 MG/DL — SIGNIFICANT CHANGE UP (ref 0.5–1.3)
GLUCOSE SERPL-MCNC: 155 MG/DL — HIGH (ref 70–115)
POTASSIUM SERPL-MCNC: 3.3 MMOL/L — LOW (ref 3.5–5.3)
POTASSIUM SERPL-SCNC: 3.3 MMOL/L — LOW (ref 3.5–5.3)
SODIUM SERPL-SCNC: 148 MMOL/L — HIGH (ref 135–145)

## 2018-05-31 PROCEDURE — 99233 SBSQ HOSP IP/OBS HIGH 50: CPT

## 2018-05-31 RX ORDER — POTASSIUM CHLORIDE 20 MEQ
20 PACKET (EA) ORAL
Qty: 0 | Refills: 0 | Status: COMPLETED | OUTPATIENT
Start: 2018-05-31 | End: 2018-05-31

## 2018-05-31 RX ADMIN — Medication 20 MILLIEQUIVALENT(S): at 11:34

## 2018-05-31 RX ADMIN — Medication 20 MILLIEQUIVALENT(S): at 11:35

## 2018-05-31 RX ADMIN — PIPERACILLIN AND TAZOBACTAM 25 GRAM(S): 4; .5 INJECTION, POWDER, LYOPHILIZED, FOR SOLUTION INTRAVENOUS at 05:39

## 2018-05-31 RX ADMIN — MEMANTINE HYDROCHLORIDE 10 MILLIGRAM(S): 10 TABLET ORAL at 11:35

## 2018-05-31 RX ADMIN — Medication 40 MILLIGRAM(S): at 05:39

## 2018-05-31 RX ADMIN — QUETIAPINE FUMARATE 100 MILLIGRAM(S): 200 TABLET, FILM COATED ORAL at 22:37

## 2018-05-31 RX ADMIN — TAMSULOSIN HYDROCHLORIDE 0.8 MILLIGRAM(S): 0.4 CAPSULE ORAL at 22:37

## 2018-05-31 RX ADMIN — Medication 3 MILLILITER(S): at 10:07

## 2018-05-31 RX ADMIN — Medication 3 MILLILITER(S): at 04:00

## 2018-05-31 RX ADMIN — Medication 3 MILLILITER(S): at 21:54

## 2018-05-31 RX ADMIN — DONEPEZIL HYDROCHLORIDE 5 MILLIGRAM(S): 10 TABLET, FILM COATED ORAL at 22:37

## 2018-05-31 RX ADMIN — ATORVASTATIN CALCIUM 80 MILLIGRAM(S): 80 TABLET, FILM COATED ORAL at 22:37

## 2018-05-31 RX ADMIN — Medication 50 MILLIGRAM(S): at 05:39

## 2018-05-31 RX ADMIN — QUETIAPINE FUMARATE 50 MILLIGRAM(S): 200 TABLET, FILM COATED ORAL at 11:35

## 2018-05-31 RX ADMIN — Medication 250 MILLIGRAM(S): at 05:40

## 2018-05-31 RX ADMIN — Medication 20 MILLIEQUIVALENT(S): at 13:54

## 2018-05-31 RX ADMIN — Medication 3 MILLILITER(S): at 14:55

## 2018-05-31 RX ADMIN — Medication 250 MILLIGRAM(S): at 17:13

## 2018-05-31 NOTE — PROGRESS NOTE ADULT - SUBJECTIVE AND OBJECTIVE BOX
ABBY VIDAL  ----------------------------------------  The patient was seen and evaluated for urine retention.  The patient is in no acute distress.  Denied any chest pain, palpitations, dyspnea, or abdominal pain.  Offers no complaints, ate less than half of breakfast.    Vital Signs Last 24 Hrs  T(C): 36.6 (31 May 2018 00:56), Max: 37.1 (30 May 2018 09:29)  T(F): 97.9 (31 May 2018 00:56), Max: 98.8 (30 May 2018 09:29)  HR: 82 (31 May 2018 05:36) (63 - 99)  BP: 152/84 (31 May 2018 05:36) (142/77 - 155/85)  BP(mean): --  RR: 20 (31 May 2018 00:56) (18 - 20)  SpO2: 99% (31 May 2018 00:56) (91% - 99%)    PHYSICAL EXAMINATION:  ----------------------------------------  General appearance: No acute distress, Awake, Alert  HEENT: Normocephalic, Atraumatic, Conjunctiva clear, EOMI  Neck: Supple, No JVD, No tenderness  Lungs: Clear to auscultation, Breath sound equal bilaterally, No rales, Mild wheezes  Cardiovascular: S1S2, Regular rhythm  Abdomen: Soft, Nontender, Nondistended, No guarding/rebound, Positive bowel sounds  Extremities: No clubbing, No cyanosis, No calf tenderness, No lower extremity edema  Neuro: Strength equal bilaterally, No tremors  Psychiatric: Appropriate mood, Normal affect, Disoriented    LABORATORY STUDIES:  ----------------------------------------  05-31    148<H>  |  109<H>  |  14.0  ----------------------------<  155<H>  3.3<L>   |  26.0  |  0.84    Ca    8.3<L>      31 May 2018 07:46    MEDICATIONS  (STANDING):  ALBUTerol/ipratropium for Nebulization 3 milliLiter(s) Nebulizer every 6 hours  atorvastatin 80 milliGRAM(s) Oral at bedtime  donepezil 5 milliGRAM(s) Oral at bedtime  furosemide    Tablet 40 milliGRAM(s) Oral daily  memantine 10 milliGRAM(s) Oral daily  metoprolol succinate ER 50 milliGRAM(s) Oral daily  piperacillin/tazobactam IVPB. 3.375 Gram(s) IV Intermittent every 8 hours  QUEtiapine 50 milliGRAM(s) Oral daily  QUEtiapine 100 milliGRAM(s) Oral at bedtime  saccharomyces boulardii 250 milliGRAM(s) Oral two times a day  tamsulosin 0.8 milliGRAM(s) Oral at bedtime      ASSESSMENT / PLAN:  ----------------------------------------  Sepsis / Urinary tract infection - Continues to be afebrile. To complete the course of piperacillin/tazobactam.    Urine retention - On tamsulosin with urine catheter in place producing clear urine. To maintain urine catheter for minimum of one week as per Urology recommendations.  Constant observation due to the history of dementia and propensity to pull on items.    Acute kidney injury - Repeat laboratory studies with improvement in renal function.    Toxic metabolic encephalopathy / Dementia - On donepezil memantine, and quetiapine. CT of the head was without acute intracranial pathology.    Hypertension - On metoprolol. Close blood pressure monitoring.    Hypoxia - Oxygen saturation was 89% on ambient air. Additional furosemide for diuresis with improvement.    Lower extremity edema - Furosemide was restarted with improvement. Lower extremity doppler was without DVT.    CAD - On atorvastatin. ABBY VIDAL  ----------------------------------------  The patient was seen and evaluated for urine retention.  The patient is in no acute distress.  Denied any chest pain, palpitations, dyspnea, or abdominal pain.  Offers no complaints, ate less than half of breakfast.    Vital Signs Last 24 Hrs  T(C): 36.6 (31 May 2018 00:56), Max: 37.1 (30 May 2018 09:29)  T(F): 97.9 (31 May 2018 00:56), Max: 98.8 (30 May 2018 09:29)  HR: 82 (31 May 2018 05:36) (63 - 99)  BP: 152/84 (31 May 2018 05:36) (142/77 - 155/85)  BP(mean): --  RR: 20 (31 May 2018 00:56) (18 - 20)  SpO2: 99% (31 May 2018 00:56) (91% - 99%)    PHYSICAL EXAMINATION:  ----------------------------------------  General appearance: No acute distress, Awake, Alert  HEENT: Normocephalic, Atraumatic, Conjunctiva clear, EOMI  Neck: Supple, No JVD, No tenderness  Lungs: Clear to auscultation, Breath sound equal bilaterally, No rales, Mild wheezes  Cardiovascular: S1S2, Regular rhythm  Abdomen: Soft, Nontender, Nondistended, No guarding/rebound, Positive bowel sounds  Extremities: No clubbing, No cyanosis, No calf tenderness, No lower extremity edema  Neuro: Strength equal bilaterally, No tremors  Psychiatric: Appropriate mood, Normal affect, Disoriented    LABORATORY STUDIES:  ----------------------------------------  05-31    148<H>  |  109<H>  |  14.0  ----------------------------<  155<H>  3.3<L>   |  26.0  |  0.84    Ca    8.3<L>      31 May 2018 07:46    MEDICATIONS  (STANDING):  ALBUTerol/ipratropium for Nebulization 3 milliLiter(s) Nebulizer every 6 hours  atorvastatin 80 milliGRAM(s) Oral at bedtime  donepezil 5 milliGRAM(s) Oral at bedtime  furosemide    Tablet 40 milliGRAM(s) Oral daily  memantine 10 milliGRAM(s) Oral daily  metoprolol succinate ER 50 milliGRAM(s) Oral daily  piperacillin/tazobactam IVPB. 3.375 Gram(s) IV Intermittent every 8 hours  QUEtiapine 50 milliGRAM(s) Oral daily  QUEtiapine 100 milliGRAM(s) Oral at bedtime  saccharomyces boulardii 250 milliGRAM(s) Oral two times a day  tamsulosin 0.8 milliGRAM(s) Oral at bedtime      ASSESSMENT / PLAN:  ----------------------------------------  Sepsis / Urinary tract infection - Continues to be afebrile. To complete the course of piperacillin/tazobactam.    Urine retention - On tamsulosin with urine catheter in place producing clear urine. To maintain urine catheter for minimum of one week as per Urology recommendations.  Constant observation due to the history of dementia and propensity to pull on items.    Acute kidney injury - Repeat laboratory studies with improvement in renal function.    Toxic metabolic encephalopathy / Dementia - On donepezil memantine, and quetiapine. CT of the head was without acute intracranial pathology.    Hypertension - On metoprolol. Close blood pressure monitoring.    Hypoxia - Oxygen saturation was 89% on ambient air. Additional furosemide for diuresis with improvement.    Lower extremity edema - Furosemide was restarted with improvement. Lower extremity doppler was without DVT.    CAD - On atorvastatin.    Palliative Care consultation noted.

## 2018-06-01 DIAGNOSIS — R45.1 RESTLESSNESS AND AGITATION: ICD-10-CM

## 2018-06-01 DIAGNOSIS — R33.9 RETENTION OF URINE, UNSPECIFIED: ICD-10-CM

## 2018-06-01 LAB
ANION GAP SERPL CALC-SCNC: 13 MMOL/L — SIGNIFICANT CHANGE UP (ref 5–17)
BUN SERPL-MCNC: 16 MG/DL — SIGNIFICANT CHANGE UP (ref 8–20)
CALCIUM SERPL-MCNC: 8.4 MG/DL — LOW (ref 8.6–10.2)
CHLORIDE SERPL-SCNC: 111 MMOL/L — HIGH (ref 98–107)
CO2 SERPL-SCNC: 26 MMOL/L — SIGNIFICANT CHANGE UP (ref 22–29)
CREAT SERPL-MCNC: 0.75 MG/DL — SIGNIFICANT CHANGE UP (ref 0.5–1.3)
GLUCOSE SERPL-MCNC: 133 MG/DL — HIGH (ref 70–115)
HCT VFR BLD CALC: 28.6 % — LOW (ref 42–52)
HGB BLD-MCNC: 8.8 G/DL — LOW (ref 14–18)
POTASSIUM SERPL-MCNC: 4 MMOL/L — SIGNIFICANT CHANGE UP (ref 3.5–5.3)
POTASSIUM SERPL-SCNC: 4 MMOL/L — SIGNIFICANT CHANGE UP (ref 3.5–5.3)
SODIUM SERPL-SCNC: 150 MMOL/L — HIGH (ref 135–145)

## 2018-06-01 PROCEDURE — 99233 SBSQ HOSP IP/OBS HIGH 50: CPT

## 2018-06-01 PROCEDURE — 99232 SBSQ HOSP IP/OBS MODERATE 35: CPT

## 2018-06-01 RX ORDER — ONDANSETRON 8 MG/1
4 TABLET, FILM COATED ORAL EVERY 6 HOURS
Qty: 0 | Refills: 0 | Status: DISCONTINUED | OUTPATIENT
Start: 2018-06-01 | End: 2018-06-08

## 2018-06-01 RX ORDER — PANTOPRAZOLE SODIUM 20 MG/1
40 TABLET, DELAYED RELEASE ORAL
Qty: 0 | Refills: 0 | Status: DISCONTINUED | OUTPATIENT
Start: 2018-06-01 | End: 2018-06-03

## 2018-06-01 RX ORDER — SODIUM CHLORIDE 9 MG/ML
500 INJECTION, SOLUTION INTRAVENOUS
Qty: 0 | Refills: 0 | Status: DISCONTINUED | OUTPATIENT
Start: 2018-06-01 | End: 2018-06-02

## 2018-06-01 RX ORDER — PANTOPRAZOLE SODIUM 20 MG/1
40 TABLET, DELAYED RELEASE ORAL DAILY
Qty: 0 | Refills: 0 | Status: DISCONTINUED | OUTPATIENT
Start: 2018-06-01 | End: 2018-06-01

## 2018-06-01 RX ADMIN — PANTOPRAZOLE SODIUM 40 MILLIGRAM(S): 20 TABLET, DELAYED RELEASE ORAL at 22:40

## 2018-06-01 RX ADMIN — Medication 3 MILLILITER(S): at 21:41

## 2018-06-01 RX ADMIN — ONDANSETRON 4 MILLIGRAM(S): 8 TABLET, FILM COATED ORAL at 22:40

## 2018-06-01 RX ADMIN — MEMANTINE HYDROCHLORIDE 10 MILLIGRAM(S): 10 TABLET ORAL at 12:29

## 2018-06-01 RX ADMIN — QUETIAPINE FUMARATE 50 MILLIGRAM(S): 200 TABLET, FILM COATED ORAL at 12:29

## 2018-06-01 RX ADMIN — Medication 3 MILLILITER(S): at 03:32

## 2018-06-01 RX ADMIN — Medication 40 MILLIGRAM(S): at 06:18

## 2018-06-01 RX ADMIN — Medication 3 MILLILITER(S): at 15:35

## 2018-06-01 RX ADMIN — Medication 250 MILLIGRAM(S): at 17:40

## 2018-06-01 RX ADMIN — SODIUM CHLORIDE 50 MILLILITER(S): 9 INJECTION, SOLUTION INTRAVENOUS at 12:29

## 2018-06-01 RX ADMIN — ATORVASTATIN CALCIUM 80 MILLIGRAM(S): 80 TABLET, FILM COATED ORAL at 22:41

## 2018-06-01 RX ADMIN — Medication 50 MILLIGRAM(S): at 06:18

## 2018-06-01 RX ADMIN — Medication 250 MILLIGRAM(S): at 06:18

## 2018-06-01 RX ADMIN — Medication 3 MILLILITER(S): at 09:36

## 2018-06-01 NOTE — PROGRESS NOTE ADULT - SUBJECTIVE AND OBJECTIVE BOX
CC:  follow up GOC  INTERVAL HPI/OVERNIGHT EVENTS:  Remains on 1:1  Wife reports sundowns  PRESENT SYMPTOMS: SOURCE:  Patient/Family/Team    PAIN SCALE:  0 = none  1 = mild   2 = moderate  3 = severe    Pain: 0    Dyspnea:  [ ] YES [x ] NO  Anxiety:  [x ] YES [ ] NO  Fatigue: [ x] YES [ ] NO  Nausea: [ ] YES [ x] NO  Loss of Appetite: [x YES [ ] NO  Other symptoms: __________    MEDICATIONS  (STANDING):  ALBUTerol/ipratropium for Nebulization 3 milliLiter(s) Nebulizer every 6 hours  atorvastatin 80 milliGRAM(s) Oral at bedtime  dextrose 5%. 500 milliLiter(s) (50 mL/Hr) IV Continuous <Continuous>  donepezil 5 milliGRAM(s) Oral at bedtime  furosemide    Tablet 40 milliGRAM(s) Oral daily  memantine 10 milliGRAM(s) Oral daily  metoprolol succinate ER 50 milliGRAM(s) Oral daily  QUEtiapine 50 milliGRAM(s) Oral daily  QUEtiapine 100 milliGRAM(s) Oral at bedtime  saccharomyces boulardii 250 milliGRAM(s) Oral two times a day  tamsulosin 0.8 milliGRAM(s) Oral at bedtime    MEDICATIONS  (PRN):      Allergies    No Known Allergies    Intolerances       Karnofsky Performance Score/Palliative Performance Status Version 2:  30       %    Vital Signs Last 24 Hrs  T(C): 36.4 (01 Jun 2018 16:21), Max: 37.1 (31 May 2018 23:11)  T(F): 97.5 (01 Jun 2018 16:21), Max: 98.8 (31 May 2018 23:11)  HR: 84 (01 Jun 2018 16:21) (84 - 108)  BP: 138/84 (01 Jun 2018 16:21) (138/84 - 154/84)  BP(mean): --  RR: 20 (01 Jun 2018 16:21) (18 - 20)  SpO2: 95% (01 Jun 2018 16:21) (94% - 98%)    PHYSICAL EXAM:    General:  Elderly man, NAD, confused, more verbal    HEENT: [ x] normal  [ ] dry mouth  [ ] ET tube/trach    Lungs: [ x] comfortable [ ] tachypnea/labored breathing  [ ] excessive secretions    CV: [x ] normal  [ ] tachycardia    GI: [x ] normal  [ ] distended  [ ] tender  [ ] no BS               [ ] PEG/NG/OG tube    : [ ] normal  [ ] incontinent  [ ] oliguria/anuria  [ x] early    MSK: [ ] normal  [x ] weakness  [ ] edema             [ ] ambulatory  [ ] bedbound/wheelchair bound    Skin: [ ] normal  [ ] pressure ulcers- Stage_____  [x ] no rash    LABS:    06-01    150<H>  |  111<H>  |  16.0  ----------------------------<  133<H>  4.0   |  26.0  |  0.75    Ca    8.4<L>      01 Jun 2018 08:13          I&O's Summary    31 May 2018 07:01  -  01 Jun 2018 07:00  --------------------------------------------------------  IN: 0 mL / OUT: 2200 mL / NET: -2200 mL    01 Jun 2018 07:01  -  01 Jun 2018 17:11  --------------------------------------------------------  IN: 0 mL / OUT: 1450 mL / NET: -1450 mL      Thank you for the opportunity to assist with the care of this patient.   [place] [facility] Consult Service [phone].

## 2018-06-01 NOTE — CHART NOTE - NSCHARTNOTEFT_GEN_A_CORE
Called to evaluate pt who nursing staff noted coffee ground emesis in small quantity from pt.  Pt has dementia and does not provide history or complaints.      REVIEW OF SYSTEMS: (from chart)     CONSTITUTIONAL: No fever, weight loss, or fatigue  EYES: No eye pain, visual disturbances, or discharge  EENT:  No difficulty hearing, tinnitus, vertigo; No sinus or throat pain  NECK: No pain or stiffness  BREASTS: No pain, masses, or nipple discharge  RESPIRATORY: No cough, wheezing, chills or hemoptysis; No shortness of breath  CARDIOVASCULAR: No chest pain, palpitations, dizziness, or leg swelling  GASTROINTESTINAL: No abdominal or epigastric pain. No nausea,, or hematemesis; No diarrhea or constipation. No melena or hematochezia.    + vomiting  GENITOURINARY: No dysuria, frequency, hematuria, or incontinence  NEUROLOGICAL: No headaches, memory loss, loss of strength, numbness, or tremors  SKIN: No itching, burning, rashes, or lesions   LYMPH NODES: No enlarged glands  ENDOCRINE: No heat or cold intolerance; No hair loss  MUSCULOSKELETAL: No joint pain or swelling; No muscle, back, or extremity pain  PSYCHIATRIC: + dementia  HEME/LYMPH: No easy bruising, or bleeding gums  ALLERGY AND IMMUNOLOGIC: No hives or eczema    PAST MEDICAL & SURGICAL HISTORY:  Asthma  Anxiety  MI (myocardial infarction)  High cholesterol  Prostate CA  HTN (hypertension)  Dementia  Patient is a 84y old  Male who presents with a chief complaint of ALBUTerol/ipratropium for Nebulization 3 milliLiter(s) Nebulizer every 6 hours  atorvastatin 80 milliGRAM(s) Oral at bedtime  dextrose 5%. 500 milliLiter(s) IV Continuous <Continuous>  donepezil 5 milliGRAM(s) Oral at bedtime  furosemide    Tablet 40 milliGRAM(s) Oral daily  memantine 10 milliGRAM(s) Oral daily  metoprolol succinate ER 50 milliGRAM(s) Oral daily  pantoprazole  Injectable 40 milliGRAM(s) IV Push daily  QUEtiapine 50 milliGRAM(s) Oral daily  QUEtiapine 100 milliGRAM(s) Oral at bedtime  saccharomyces boulardii 250 milliGRAM(s) Oral two times a day  tamsulosin 0.8 milliGRAM(s) Oral at bedtime    On examination:  /79, P-88, R- 16, Pulse ox 96% on 2L/nc; afebrile  Pt is alert and talkative sitting up in bed.  No apparent distress    Lungs- poor inspiration but no appreciated rales/rhonchi or wheezes  Heart- s1s2 heard, RRR no murmur  Abdomen- + bowel sounds all quadrants, nontender, no appreciated masses or organomegaly  Extremities- no cyanosis or edema    Impression: 1- GERD                     2- dementia                     3- CAD with h/o mi, hypercholesterolemia  Plan: 1- stat H&H, start protonix i.v. daily          2- findings and plan discussed with RN/aid and Dr. JAY Benjamin.  All questions answered, and Dr. Benjamni in agreement with plan of care           3- observe, re-evaluate as necessary Called to evaluate pt who nursing staff noted coffee ground emesis in small quantity from pt.  Pt has dementia and does not provide history or complaints.      REVIEW OF SYSTEMS: (from chart)     CONSTITUTIONAL: No fever, weight loss, or fatigue  EYES: No eye pain, visual disturbances, or discharge  EENT:  No difficulty hearing, tinnitus, vertigo; No sinus or throat pain  NECK: No pain or stiffness  BREASTS: No pain, masses, or nipple discharge  RESPIRATORY: No cough, wheezing, chills or hemoptysis; No shortness of breath  CARDIOVASCULAR: No chest pain, palpitations, dizziness, or leg swelling  GASTROINTESTINAL: No abdominal or epigastric pain. No nausea,, or hematemesis; No diarrhea or constipation. No melena or hematochezia.    + vomiting  GENITOURINARY: No dysuria, frequency, hematuria, or incontinence  NEUROLOGICAL: No headaches, memory loss, loss of strength, numbness, or tremors  SKIN: No itching, burning, rashes, or lesions   LYMPH NODES: No enlarged glands  ENDOCRINE: No heat or cold intolerance; No hair loss  MUSCULOSKELETAL: No joint pain or swelling; No muscle, back, or extremity pain  PSYCHIATRIC: + dementia  HEME/LYMPH: No easy bruising, or bleeding gums  ALLERGY AND IMMUNOLOGIC: No hives or eczema    PAST MEDICAL & SURGICAL HISTORY:  Asthma  Anxiety  MI (myocardial infarction)  High cholesterol  Prostate CA  HTN (hypertension)  Dementia  Patient is a 84y old  Male who presents with a chief complaint of ALBUTerol/ipratropium for Nebulization 3 milliLiter(s) Nebulizer every 6 hours  atorvastatin 80 milliGRAM(s) Oral at bedtime  dextrose 5%. 500 milliLiter(s) IV Continuous <Continuous>  donepezil 5 milliGRAM(s) Oral at bedtime  furosemide    Tablet 40 milliGRAM(s) Oral daily  memantine 10 milliGRAM(s) Oral daily  metoprolol succinate ER 50 milliGRAM(s) Oral daily  pantoprazole  Injectable 40 milliGRAM(s) IV Push daily  QUEtiapine 50 milliGRAM(s) Oral daily  QUEtiapine 100 milliGRAM(s) Oral at bedtime  saccharomyces boulardii 250 milliGRAM(s) Oral two times a day  tamsulosin 0.8 milliGRAM(s) Oral at bedtime    On examination:  /79, P-88, R- 16, Pulse ox 96% on 2L/nc; afebrile  Pt is alert and talkative sitting up in bed.  No apparent distress    Lungs- poor inspiration but no appreciated rales/rhonchi or wheezes  Heart- s1s2 heard, RRR no murmur  Abdomen- + bowel sounds all quadrants, nontender, no appreciated masses or organomegaly  Extremities- no cyanosis or edema    Impression: 1- GERD with no evidence of active gi bleed                     2- dementia                     3- CAD with h/o mi, hypercholesterolemia  Plan: 1- stat H&H, start protonix i.v. daily          2- findings and plan discussed with RN/aid and Dr. JAY Benjamin.  All questions answered, and Dr. Benjamin in agreement with plan of care           3- observe, re-evaluate as necessary      Addendum:  21:15  H&H 8.8/28.8 (on 5/28 8.0/25)- stable at present time

## 2018-06-01 NOTE — PROGRESS NOTE ADULT - ASSESSMENT
84yr man, hx of Lewy Body dementia, prostate ca,  transferred from [facility] for further tx,. Patient with recent fall sustaining hip fracture with IMN.  Patient now appears to have decline in functional status.

## 2018-06-01 NOTE — DIETITIAN INITIAL EVALUATION ADULT. - OTHER INFO
Pt currently resting, receiving respiratory treatment, aware confusion.  Pt with decreased po intake at meals per RN flowsheets and nursing assistant. Pt consumed <25% at breakfast per tray observation.  Pt consumes more fluids than food per nursing assistant- may benefit from nutrition supplement.

## 2018-06-01 NOTE — PROGRESS NOTE ADULT - SUBJECTIVE AND OBJECTIVE BOX
ABBY YOUNG  ----------------------------------------  The patient was seen and evaluated for urine retention.  The patient is in no acute distress.  Disoriented, offers no complaints.    Vital Signs Last 24 Hrs  T(C): 36.7 (01 Jun 2018 08:24), Max: 37.1 (31 May 2018 23:11)  T(F): 98.1 (01 Jun 2018 08:24), Max: 98.8 (31 May 2018 23:11)  HR: 100 (01 Jun 2018 09:48) (72 - 108)  BP: 154/84 (01 Jun 2018 08:24) (150/91 - 154/84)  BP(mean): --  RR: 19 (01 Jun 2018 08:24) (18 - 20)  SpO2: 98% (01 Jun 2018 08:24) (94% - 98%)    PHYSICAL EXAMINATION:  ----------------------------------------  General appearance: No acute distress, Awake, Alert  HEENT: Normocephalic, Atraumatic, Conjunctiva clear, EOMI  Neck: Supple, No JVD, No tenderness  Lungs: Clear to auscultation, Breath sound equal bilaterally, No rales, Mild wheezes  Cardiovascular: S1S2, Regular rhythm  Abdomen: Soft, Nontender, Nondistended, No guarding/rebound, Positive bowel sounds  Extremities: No clubbing, No cyanosis, No calf tenderness, No lower extremity edema  Neuro: Strength equal bilaterally, No tremors  Psychiatric: Appropriate mood, Normal affect, Disoriented    LABORATORY STUDIES:  ----------------------------------------  06-01    150<H>  |  111<H>  |  16.0  ----------------------------<  133<H>  4.0   |  26.0  |  0.75    Ca    8.4<L>      01 Jun 2018 08:13    MEDICATIONS  (STANDING):  ALBUTerol/ipratropium for Nebulization 3 milliLiter(s) Nebulizer every 6 hours  atorvastatin 80 milliGRAM(s) Oral at bedtime  donepezil 5 milliGRAM(s) Oral at bedtime  furosemide    Tablet 40 milliGRAM(s) Oral daily  memantine 10 milliGRAM(s) Oral daily  metoprolol succinate ER 50 milliGRAM(s) Oral daily  QUEtiapine 50 milliGRAM(s) Oral daily  QUEtiapine 100 milliGRAM(s) Oral at bedtime  saccharomyces boulardii 250 milliGRAM(s) Oral two times a day  tamsulosin 0.8 milliGRAM(s) Oral at bedtime      ASSESSMENT / PLAN:  ----------------------------------------  Sepsis / Urinary tract infection - Continues to be afebrile. Completed the course of piperacillin/tazobactam.    Urine retention - On tamsulosin with urine catheter in place producing clear urine. To maintain urine catheter for minimum of one week as per Urology recommendations (currently day 6).  Constant observation due to the history of dementia and propensity to pull on items.    Acute kidney injury - Repeat laboratory studies with improvement in renal function. Free water for hypernatremia.    Toxic metabolic encephalopathy / Dementia - On donepezil memantine, and quetiapine. CT of the head was without acute intracranial pathology.    Hypertension - On metoprolol. Close blood pressure monitoring.    Hypoxia - Oxygen saturation was 87% on ambient air. On furosemide for diuresis with slow improvement.    Lower extremity edema - Furosemide was restarted with improvement. Lower extremity doppler was without DVT.    CAD - On atorvastatin. ABBY YOUNG  ----------------------------------------  The patient was seen and evaluated for urine retention.  The patient is in no acute distress.  Disoriented, offers no complaints.    Vital Signs Last 24 Hrs  T(C): 36.7 (01 Jun 2018 08:24), Max: 37.1 (31 May 2018 23:11)  T(F): 98.1 (01 Jun 2018 08:24), Max: 98.8 (31 May 2018 23:11)  HR: 100 (01 Jun 2018 09:48) (72 - 108)  BP: 154/84 (01 Jun 2018 08:24) (150/91 - 154/84)  BP(mean): --  RR: 19 (01 Jun 2018 08:24) (18 - 20)  SpO2: 98% (01 Jun 2018 08:24) (94% - 98%)    PHYSICAL EXAMINATION:  ----------------------------------------  General appearance: No acute distress, Awake, Alert  HEENT: Normocephalic, Atraumatic, Conjunctiva clear, EOMI  Neck: Supple, No JVD, No tenderness  Lungs: Clear to auscultation, Breath sound equal bilaterally, No rales, Mild wheezes  Cardiovascular: S1S2, Regular rhythm  Abdomen: Soft, Nontender, Nondistended, No guarding/rebound, Positive bowel sounds  Extremities: No clubbing, No cyanosis, No calf tenderness, No lower extremity edema  Neuro: Strength equal bilaterally, No tremors  Psychiatric: Appropriate mood, Normal affect, Disoriented    LABORATORY STUDIES:  ----------------------------------------  06-01    150<H>  |  111<H>  |  16.0  ----------------------------<  133<H>  4.0   |  26.0  |  0.75    Ca    8.4<L>      01 Jun 2018 08:13    MEDICATIONS  (STANDING):  ALBUTerol/ipratropium for Nebulization 3 milliLiter(s) Nebulizer every 6 hours  atorvastatin 80 milliGRAM(s) Oral at bedtime  donepezil 5 milliGRAM(s) Oral at bedtime  furosemide    Tablet 40 milliGRAM(s) Oral daily  memantine 10 milliGRAM(s) Oral daily  metoprolol succinate ER 50 milliGRAM(s) Oral daily  QUEtiapine 50 milliGRAM(s) Oral daily  QUEtiapine 100 milliGRAM(s) Oral at bedtime  saccharomyces boulardii 250 milliGRAM(s) Oral two times a day  tamsulosin 0.8 milliGRAM(s) Oral at bedtime      ASSESSMENT / PLAN:  ----------------------------------------  Sepsis / Urinary tract infection - Continues to be afebrile. Completed the course of piperacillin/tazobactam.    Urine retention - On tamsulosin with urine catheter in place producing clear urine. To maintain urine catheter for minimum of one week as per Urology recommendations (currently day 6).  Constant observation due to the history of dementia and propensity to pull on items.    Acute kidney injury - Repeat laboratory studies with improvement in renal function. Free water for hypernatremia.    Toxic metabolic encephalopathy / Dementia - On donepezil memantine, and quetiapine. CT of the head was without acute intracranial pathology.    Hypertension - On metoprolol. Close blood pressure monitoring.    Hypoxia - Oxygen saturation was 87% on ambient air. On furosemide for diuresis with slow improvement.    Lower extremity edema - Furosemide was restarted with improvement. Lower extremity doppler was without DVT.    CAD - On atorvastatin.    Severe protein calorie malnutrition - Nutrition evaluation noted. Encouraged oral intake. ABBY VIDAL  ----------------------------------------  The patient was seen and evaluated for urine retention.  The patient is in no acute distress.  Disoriented, offers no complaints.    HPI:  84M with a prior admission to [facility] for a hip fracture and subsequently transferred to a rehabilitation facility was found to have fever, altered mental status, and urine retention. He was transferred to [facility] and a urinary catheter was attempted unsuccessfully. The patient was then transferred to [facility] for further management. The patient was seen by Urology and a urinary catheter was inserted with the initiation of continuous bladder irrigation for hematuria. Intravenous antibiotics were initiated for sepsis and urinary tract infection. Lower extremity Doppler was without DVT. The patient was noted to be disoriented and pulling at devices requiring constant observation. The patient’s family reported that he was previously been treated with quetiapine but was later started on divalproex at the rehabilitation facility and quetiapine was restarted. CT of the head noted moderate cerebral volume loss, no gross intracranial hemorrhage, intracranial mass, midline shift, or acute stroke. The patient had improvement in the lethargy but continued to be disoriented. Repeat laboratory studies noted improvement in the kidney function and furosemide was restarted. The patient’s wife was met by Palliative Care to discuss the patient’s decline in functional status since his prior hip surgery one year prior. The patient completed the course of antibiotics.    Vital Signs Last 24 Hrs  T(C): 36.7 (01 Jun 2018 08:24), Max: 37.1 (31 May 2018 23:11)  T(F): 98.1 (01 Jun 2018 08:24), Max: 98.8 (31 May 2018 23:11)  HR: 100 (01 Jun 2018 09:48) (72 - 108)  BP: 154/84 (01 Jun 2018 08:24) (150/91 - 154/84)  BP(mean): --  RR: 19 (01 Jun 2018 08:24) (18 - 20)  SpO2: 98% (01 Jun 2018 08:24) (94% - 98%)    PHYSICAL EXAMINATION:  ----------------------------------------  General appearance: No acute distress, Awake, Alert  HEENT: Normocephalic, Atraumatic, Conjunctiva clear, EOMI  Neck: Supple, No JVD, No tenderness  Lungs: Clear to auscultation, Breath sound equal bilaterally, No rales, Mild wheezes  Cardiovascular: S1S2, Regular rhythm  Abdomen: Soft, Nontender, Nondistended, No guarding/rebound, Positive bowel sounds  Extremities: No clubbing, No cyanosis, No calf tenderness, No lower extremity edema  Neuro: Strength equal bilaterally, No tremors  Psychiatric: Appropriate mood, Normal affect, Disoriented    LABORATORY STUDIES:  ----------------------------------------  06-01    150<H>  |  111<H>  |  16.0  ----------------------------<  133<H>  4.0   |  26.0  |  0.75    Ca    8.4<L>      01 Jun 2018 08:13    MEDICATIONS  (STANDING):  ALBUTerol/ipratropium for Nebulization 3 milliLiter(s) Nebulizer every 6 hours  atorvastatin 80 milliGRAM(s) Oral at bedtime  donepezil 5 milliGRAM(s) Oral at bedtime  furosemide    Tablet 40 milliGRAM(s) Oral daily  memantine 10 milliGRAM(s) Oral daily  metoprolol succinate ER 50 milliGRAM(s) Oral daily  QUEtiapine 50 milliGRAM(s) Oral daily  QUEtiapine 100 milliGRAM(s) Oral at bedtime  saccharomyces boulardii 250 milliGRAM(s) Oral two times a day  tamsulosin 0.8 milliGRAM(s) Oral at bedtime      ASSESSMENT / PLAN:  ----------------------------------------  Sepsis / Urinary tract infection - Continues to be afebrile. Completed the course of piperacillin/tazobactam.    Urine retention - On tamsulosin with urine catheter in place producing clear urine. To maintain urine catheter for minimum of one week as per Urology recommendations (currently day 6).  Constant observation due to the history of dementia and propensity to pull on items.    Acute kidney injury - Repeat laboratory studies with improvement in renal function. Free water for hypernatremia.    Toxic metabolic encephalopathy / Dementia - On donepezil memantine, and quetiapine. CT of the head was without acute intracranial pathology.    Hypertension - On metoprolol. Close blood pressure monitoring.    Hypoxia - Oxygen saturation was 87% on ambient air. On furosemide for diuresis with slow improvement.    Lower extremity edema - Furosemide was restarted with improvement. Lower extremity doppler was without DVT.    CAD - On atorvastatin.    Severe protein calorie malnutrition - Nutrition evaluation noted. Encouraged oral intake.

## 2018-06-01 NOTE — PROGRESS NOTE ADULT - PROBLEM SELECTOR PLAN 5
Met with wife. She had wished for  to go to [facility] for [facility].  She states of significant decline this hospitalizaiton- less interactive, poor po intake, and in need of more care.   Discussed consideration for hospice evaluation whether at [facility] or before discharge (if she prefers not to go to [facility]). She is already thinking about private hiring knowing he is in need of more care.  Name of [facility] given. Wife appreciative of information

## 2018-06-01 NOTE — CHART NOTE - NSCHARTNOTEFT_GEN_A_CORE
Upon Nutritional Assessment by the Registered Dietitian your patient was determined to meet criteria / has evidence of the following diagnosis/diagnoses:          [ ]  Mild Protein Calorie Malnutrition        [ ]  Moderate Protein Calorie Malnutrition        [x ] Severe Protein Calorie Malnutrition        [ ] Unspecified Protein Calorie Malnutrition        [ ] Underweight / BMI <19        [ ] Morbid Obesity / BMI > 40      Findings as based on:  •  Comprehensive nutrition assessment and consultation  •  Calorie counts (nutrient intake analysis)  •  Food acceptance and intake status from observations by staff  •  Follow up  •  Patient education  •  Intervention secondary to interdisciplinary rounds  •   concerns      Treatment:    The following diet has been recommended:  Add 8 oz. Ensure Enlive tid     PROVIDER Section:     By signing this assessment you are acknowledging and agree with the diagnosis/diagnoses assigned by the Registered Dietitian    Comments:

## 2018-06-02 LAB
ANION GAP SERPL CALC-SCNC: 12 MMOL/L — SIGNIFICANT CHANGE UP (ref 5–17)
BUN SERPL-MCNC: 16 MG/DL — SIGNIFICANT CHANGE UP (ref 8–20)
CALCIUM SERPL-MCNC: 8.5 MG/DL — LOW (ref 8.6–10.2)
CHLORIDE SERPL-SCNC: 105 MMOL/L — SIGNIFICANT CHANGE UP (ref 98–107)
CO2 SERPL-SCNC: 28 MMOL/L — SIGNIFICANT CHANGE UP (ref 22–29)
CREAT SERPL-MCNC: 0.76 MG/DL — SIGNIFICANT CHANGE UP (ref 0.5–1.3)
GLUCOSE SERPL-MCNC: 152 MG/DL — HIGH (ref 70–115)
HCT VFR BLD CALC: 27.4 % — LOW (ref 42–52)
HGB BLD-MCNC: 8.3 G/DL — LOW (ref 14–18)
MCHC RBC-ENTMCNC: 28.8 PG — SIGNIFICANT CHANGE UP (ref 27–31)
MCHC RBC-ENTMCNC: 30.3 G/DL — LOW (ref 32–36)
MCV RBC AUTO: 95.1 FL — HIGH (ref 80–94)
PLATELET # BLD AUTO: 286 K/UL — SIGNIFICANT CHANGE UP (ref 150–400)
POTASSIUM SERPL-MCNC: 4 MMOL/L — SIGNIFICANT CHANGE UP (ref 3.5–5.3)
POTASSIUM SERPL-SCNC: 4 MMOL/L — SIGNIFICANT CHANGE UP (ref 3.5–5.3)
RBC # BLD: 2.88 M/UL — LOW (ref 4.6–6.2)
RBC # FLD: 14.9 % — SIGNIFICANT CHANGE UP (ref 11–15.6)
SODIUM SERPL-SCNC: 145 MMOL/L — SIGNIFICANT CHANGE UP (ref 135–145)
WBC # BLD: 11.7 K/UL — HIGH (ref 4.8–10.8)
WBC # FLD AUTO: 11.7 K/UL — HIGH (ref 4.8–10.8)

## 2018-06-02 PROCEDURE — 99233 SBSQ HOSP IP/OBS HIGH 50: CPT

## 2018-06-02 RX ORDER — IPRATROPIUM/ALBUTEROL SULFATE 18-103MCG
3 AEROSOL WITH ADAPTER (GRAM) INHALATION EVERY 6 HOURS
Qty: 0 | Refills: 0 | Status: DISCONTINUED | OUTPATIENT
Start: 2018-06-02 | End: 2018-06-08

## 2018-06-02 RX ORDER — HEPARIN SODIUM 5000 [USP'U]/ML
5000 INJECTION INTRAVENOUS; SUBCUTANEOUS EVERY 12 HOURS
Qty: 0 | Refills: 0 | Status: DISCONTINUED | OUTPATIENT
Start: 2018-06-02 | End: 2018-06-08

## 2018-06-02 RX ADMIN — ATORVASTATIN CALCIUM 80 MILLIGRAM(S): 80 TABLET, FILM COATED ORAL at 21:30

## 2018-06-02 RX ADMIN — HEPARIN SODIUM 5000 UNIT(S): 5000 INJECTION INTRAVENOUS; SUBCUTANEOUS at 17:35

## 2018-06-02 RX ADMIN — QUETIAPINE FUMARATE 50 MILLIGRAM(S): 200 TABLET, FILM COATED ORAL at 12:00

## 2018-06-02 RX ADMIN — DONEPEZIL HYDROCHLORIDE 5 MILLIGRAM(S): 10 TABLET, FILM COATED ORAL at 21:31

## 2018-06-02 RX ADMIN — PANTOPRAZOLE SODIUM 40 MILLIGRAM(S): 20 TABLET, DELAYED RELEASE ORAL at 05:18

## 2018-06-02 RX ADMIN — Medication 10 MILLIGRAM(S): at 17:35

## 2018-06-02 RX ADMIN — Medication 250 MILLIGRAM(S): at 05:18

## 2018-06-02 RX ADMIN — PANTOPRAZOLE SODIUM 40 MILLIGRAM(S): 20 TABLET, DELAYED RELEASE ORAL at 17:35

## 2018-06-02 RX ADMIN — TAMSULOSIN HYDROCHLORIDE 0.8 MILLIGRAM(S): 0.4 CAPSULE ORAL at 21:30

## 2018-06-02 RX ADMIN — MEMANTINE HYDROCHLORIDE 10 MILLIGRAM(S): 10 TABLET ORAL at 12:00

## 2018-06-02 RX ADMIN — Medication 250 MILLIGRAM(S): at 17:35

## 2018-06-02 RX ADMIN — Medication 50 MILLIGRAM(S): at 05:18

## 2018-06-02 RX ADMIN — Medication 40 MILLIGRAM(S): at 05:18

## 2018-06-02 RX ADMIN — Medication 3 MILLILITER(S): at 21:30

## 2018-06-02 RX ADMIN — Medication 3 MILLILITER(S): at 09:11

## 2018-06-02 RX ADMIN — Medication 3 MILLILITER(S): at 15:24

## 2018-06-02 RX ADMIN — QUETIAPINE FUMARATE 100 MILLIGRAM(S): 200 TABLET, FILM COATED ORAL at 21:31

## 2018-06-02 NOTE — PROGRESS NOTE ADULT - ASSESSMENT
Patient is an 83 y/o M with a prior admission to [facility] for a hip fracture and subsequently transferred to a rehabilitation facility was found to have fever, altered mental status, and urine retention. He was transferred to [facility] and a urinary catheter was attempted unsuccessfully. The patient was then transferred to [facility] for further management. The patient was seen by Urology and a urinary catheter was inserted with the initiation of continuous bladder irrigation for hematuria. Intravenous antibiotics were initiated for sepsis and urinary tract infection. Lower extremity Doppler was without DVT. The patient was noted to be disoriented and pulling at devices requiring constant observation. The patient’s family reported that he was previously been treated with quetiapine but was later started on divalproex at the rehabilitation facility and quetiapine was restarted. CT of the head noted moderate cerebral volume loss, no gross intracranial hemorrhage, intracranial mass, midline shift, or acute stroke. The patient had improvement in the lethargy but continued to be disoriented. Repeat laboratory studies noted improvement in the kidney function and furosemide was restarted. The patient’s wife was met by Palliative Care to discuss the patient’s decline in functional status since his prior hip surgery one year prior. The patient completed the course of antibiotics.    1)UTI initially presenting with sepsis which is now resolved. Presented with toxic metabolic encephalopathy with underlying dementia   -s/p zosyn tx   -urine culture negative   -d/c cbi     Presenting with urinary retention - seen by Urology in ER, early placed - continue with early for 1 week until f/up   -c/w flomax in the interim     2) ALBERTO likely due to some degree of ATN with low flow state   -cr now normalized. will monitor     3) Dementia with decline   -seen by palliative care   -ct head noted   -c/w quetiapine, donepezil and memantine   -currently on 1:1 in the interim to ensure that he does not interfere with medical care (aka early)    4) Htn - well controlled with cad   -c/w metoprolol   -c/w statin     5) Hypoxemia with interval development of heart failure per xray imaging along with LE edema   -lasix was stopped on prior admission   -resume lasix and titrate down from O2   -duplex negative   -patient does not have a hx of heart failure documented. Given his decline, would tx symptomatically and avoid further testing that is not likely to change overall management     6) Severe protein calorie malnutrition - Nutrition evaluation noted. Encouraged oral intake.    7) DVT ppx: SQH started for now. d/c CBI and maintain early     9)Dispo: Dnr/i. prognosis guarded Patient is an 83 y/o M with a prior admission to [facility] for a hip fracture and subsequently transferred to a rehabilitation facility was found to have fever, altered mental status, and urine retention. He was transferred to [facility] and a urinary catheter was attempted unsuccessfully. The patient was then transferred to [facility] for further management. The patient was seen by Urology and a urinary catheter was inserted with the initiation of continuous bladder irrigation for hematuria. Intravenous antibiotics were initiated for sepsis and urinary tract infection. Lower extremity Doppler was without DVT. The patient was noted to be disoriented and pulling at devices requiring constant observation. The patient’s family reported that he was previously been treated with quetiapine but was later started on divalproex at the rehabilitation facility and quetiapine was restarted. CT of the head noted moderate cerebral volume loss, no gross intracranial hemorrhage, intracranial mass, midline shift, or acute stroke. The patient had improvement in the lethargy but continued to be disoriented. Repeat laboratory studies noted improvement in the kidney function and furosemide was restarted. The patient’s wife was met by Palliative Care to discuss the patient’s decline in functional status since his prior hip surgery one year prior. The patient completed the course of antibiotics.    1)UTI initially presenting with sepsis which is now resolved. Presented with toxic metabolic encephalopathy with underlying dementia   -s/p zosyn tx   -leucocytosis noted, no fevers, will monitor labs in am  -urine culture negative   -d/c cbi     Presenting with urinary retention - seen by Urology in ER, early placed - continue with early for 1 week until f/up   -c/w flomax in the interim     2) ALBERTO likely due to some degree of ATN with low flow state   -cr now normalized. will monitor     3) Dementia with decline   -seen by palliative care   -ct head noted   -c/w quetiapine, donepezil and memantine   -currently on 1:1 in the interim to ensure that he does not interfere with medical care (aka early)    4) Htn - well controlled with cad   -c/w metoprolol   -c/w statin     5) Hypoxemia with interval development of heart failure per xray imaging along with LE edema   -lasix was stopped on prior admission   -resume lasix and titrate down from O2   -duplex negative   -patient does not have a hx of heart failure documented. Given his decline, would tx symptomatically and avoid further testing that is not likely to change overall management     6) Severe protein calorie malnutrition - Nutrition evaluation noted. Encouraged oral intake.    7) DVT ppx: SQH started for now. d/c CBI and maintain early     9)Dispo: Dnr/i. prognosis guarded

## 2018-06-03 DIAGNOSIS — K59.01 SLOW TRANSIT CONSTIPATION: ICD-10-CM

## 2018-06-03 DIAGNOSIS — R41.0 DISORIENTATION, UNSPECIFIED: ICD-10-CM

## 2018-06-03 DIAGNOSIS — N30.00 ACUTE CYSTITIS WITHOUT HEMATURIA: ICD-10-CM

## 2018-06-03 LAB
ANION GAP SERPL CALC-SCNC: 11 MMOL/L — SIGNIFICANT CHANGE UP (ref 5–17)
BUN SERPL-MCNC: 17 MG/DL — SIGNIFICANT CHANGE UP (ref 8–20)
CALCIUM SERPL-MCNC: 8.5 MG/DL — LOW (ref 8.6–10.2)
CHLORIDE SERPL-SCNC: 106 MMOL/L — SIGNIFICANT CHANGE UP (ref 98–107)
CO2 SERPL-SCNC: 28 MMOL/L — SIGNIFICANT CHANGE UP (ref 22–29)
CREAT SERPL-MCNC: 0.89 MG/DL — SIGNIFICANT CHANGE UP (ref 0.5–1.3)
GLUCOSE SERPL-MCNC: 115 MG/DL — SIGNIFICANT CHANGE UP (ref 70–115)
HCT VFR BLD CALC: 26.6 % — LOW (ref 42–52)
HGB BLD-MCNC: 8 G/DL — LOW (ref 14–18)
MAGNESIUM SERPL-MCNC: 1.7 MG/DL — SIGNIFICANT CHANGE UP (ref 1.6–2.6)
MCHC RBC-ENTMCNC: 28.8 PG — SIGNIFICANT CHANGE UP (ref 27–31)
MCHC RBC-ENTMCNC: 30.1 G/DL — LOW (ref 32–36)
MCV RBC AUTO: 95.7 FL — HIGH (ref 80–94)
PHOSPHATE SERPL-MCNC: 3.3 MG/DL — SIGNIFICANT CHANGE UP (ref 2.4–4.7)
PLATELET # BLD AUTO: 276 K/UL — SIGNIFICANT CHANGE UP (ref 150–400)
POTASSIUM SERPL-MCNC: 4 MMOL/L — SIGNIFICANT CHANGE UP (ref 3.5–5.3)
POTASSIUM SERPL-SCNC: 4 MMOL/L — SIGNIFICANT CHANGE UP (ref 3.5–5.3)
RBC # BLD: 2.78 M/UL — LOW (ref 4.6–6.2)
RBC # FLD: 14.6 % — SIGNIFICANT CHANGE UP (ref 11–15.6)
SODIUM SERPL-SCNC: 145 MMOL/L — SIGNIFICANT CHANGE UP (ref 135–145)
WBC # BLD: 8.4 K/UL — SIGNIFICANT CHANGE UP (ref 4.8–10.8)
WBC # FLD AUTO: 8.4 K/UL — SIGNIFICANT CHANGE UP (ref 4.8–10.8)

## 2018-06-03 PROCEDURE — 99232 SBSQ HOSP IP/OBS MODERATE 35: CPT

## 2018-06-03 RX ORDER — PANTOPRAZOLE SODIUM 20 MG/1
40 TABLET, DELAYED RELEASE ORAL
Qty: 0 | Refills: 0 | Status: DISCONTINUED | OUTPATIENT
Start: 2018-06-03 | End: 2018-06-08

## 2018-06-03 RX ORDER — DOCUSATE SODIUM 100 MG
100 CAPSULE ORAL THREE TIMES A DAY
Qty: 0 | Refills: 0 | Status: DISCONTINUED | OUTPATIENT
Start: 2018-06-03 | End: 2018-06-08

## 2018-06-03 RX ORDER — QUETIAPINE FUMARATE 200 MG/1
150 TABLET, FILM COATED ORAL AT BEDTIME
Qty: 0 | Refills: 0 | Status: DISCONTINUED | OUTPATIENT
Start: 2018-06-03 | End: 2018-06-08

## 2018-06-03 RX ORDER — POLYETHYLENE GLYCOL 3350 17 G/17G
17 POWDER, FOR SOLUTION ORAL DAILY
Qty: 0 | Refills: 0 | Status: DISCONTINUED | OUTPATIENT
Start: 2018-06-03 | End: 2018-06-08

## 2018-06-03 RX ORDER — SENNA PLUS 8.6 MG/1
2 TABLET ORAL AT BEDTIME
Qty: 0 | Refills: 0 | Status: DISCONTINUED | OUTPATIENT
Start: 2018-06-03 | End: 2018-06-08

## 2018-06-03 RX ADMIN — Medication 50 MILLIGRAM(S): at 05:41

## 2018-06-03 RX ADMIN — SENNA PLUS 2 TABLET(S): 8.6 TABLET ORAL at 22:03

## 2018-06-03 RX ADMIN — QUETIAPINE FUMARATE 50 MILLIGRAM(S): 200 TABLET, FILM COATED ORAL at 11:51

## 2018-06-03 RX ADMIN — PANTOPRAZOLE SODIUM 40 MILLIGRAM(S): 20 TABLET, DELAYED RELEASE ORAL at 17:01

## 2018-06-03 RX ADMIN — Medication 3 MILLILITER(S): at 04:24

## 2018-06-03 RX ADMIN — DONEPEZIL HYDROCHLORIDE 5 MILLIGRAM(S): 10 TABLET, FILM COATED ORAL at 22:03

## 2018-06-03 RX ADMIN — PANTOPRAZOLE SODIUM 40 MILLIGRAM(S): 20 TABLET, DELAYED RELEASE ORAL at 05:42

## 2018-06-03 RX ADMIN — MEMANTINE HYDROCHLORIDE 10 MILLIGRAM(S): 10 TABLET ORAL at 11:51

## 2018-06-03 RX ADMIN — Medication 3 MILLILITER(S): at 15:27

## 2018-06-03 RX ADMIN — POLYETHYLENE GLYCOL 3350 17 GRAM(S): 17 POWDER, FOR SOLUTION ORAL at 17:01

## 2018-06-03 RX ADMIN — ATORVASTATIN CALCIUM 80 MILLIGRAM(S): 80 TABLET, FILM COATED ORAL at 22:03

## 2018-06-03 RX ADMIN — TAMSULOSIN HYDROCHLORIDE 0.8 MILLIGRAM(S): 0.4 CAPSULE ORAL at 22:03

## 2018-06-03 RX ADMIN — Medication 250 MILLIGRAM(S): at 05:40

## 2018-06-03 RX ADMIN — Medication 3 MILLILITER(S): at 08:10

## 2018-06-03 RX ADMIN — Medication 40 MILLIGRAM(S): at 05:40

## 2018-06-03 RX ADMIN — Medication 250 MILLIGRAM(S): at 17:01

## 2018-06-03 RX ADMIN — HEPARIN SODIUM 5000 UNIT(S): 5000 INJECTION INTRAVENOUS; SUBCUTANEOUS at 17:01

## 2018-06-03 RX ADMIN — Medication 3 MILLILITER(S): at 20:45

## 2018-06-03 RX ADMIN — HEPARIN SODIUM 5000 UNIT(S): 5000 INJECTION INTRAVENOUS; SUBCUTANEOUS at 05:41

## 2018-06-03 RX ADMIN — ONDANSETRON 4 MILLIGRAM(S): 8 TABLET, FILM COATED ORAL at 15:13

## 2018-06-03 RX ADMIN — Medication 100 MILLIGRAM(S): at 22:06

## 2018-06-03 RX ADMIN — QUETIAPINE FUMARATE 150 MILLIGRAM(S): 200 TABLET, FILM COATED ORAL at 22:04

## 2018-06-03 NOTE — PROGRESS NOTE ADULT - SUBJECTIVE AND OBJECTIVE BOX
is an 84y old  Male who presents with a chief complaint of inability to place early. He has recovered from a UTI. he has constipaton.     He is still a little confused/delirious.    Summary:   REVIEW OF SYSTEMS    General:	"I'm doing okay."    Skin/Breast:  	  Ophthalmologic:  	  ENMT:	    Respiratory and Thorax:  	  Cardiovascular:	    Gastrointestinal:	    Genitourinary:	    Musculoskeletal:	    Neurological:	    Psychiatric:	    Hematology/Lymphatics:	    Endocrine:	    Allergic/Immunologic:	  Vital Signs Last 24 Hrs  T(C): 36.9 (03 Jun 2018 08:24), Max: 37.1 (02 Jun 2018 23:54)  T(F): 98.5 (03 Jun 2018 08:24), Max: 98.8 (02 Jun 2018 23:54)  HR: 72 (03 Jun 2018 08:24) (72 - 90)  BP: 135/66 (03 Jun 2018 08:24) (114/63 - 155/73)  BP(mean): --  RR: 18 (03 Jun 2018 08:24) (13 - 20)  SpO2: 94% (03 Jun 2018 08:24) (93% - 97%)  PHYSICAL EXAM:  GEN: NAD, comfortable, awake, alert, speaking full sentences, a little confused  HEENT: dry MM  CVS: S1S2 RRR  PULM: good breath sounds  ABD: soft, slightly distended, no tenderness, no peritoneal signs  EXTREM: no edema  NEURO: intact  PSYCH: mentating  SKIN: warm, pink                        8.0    8.4   )-----------( 276      ( 03 Jun 2018 08:30 )             26.6     06-03    145  |  106  |  17.0  ----------------------------<  115  4.0   |  28.0  |  0.89    Ca    8.5<L>      03 Jun 2018 08:30  Phos  3.3     06-03  Mg     1.7     06-03    Radiology:     MEDICATIONS  (STANDING):  ALBUTerol/ipratropium for Nebulization 3 milliLiter(s) Nebulizer every 6 hours  atorvastatin 80 milliGRAM(s) Oral at bedtime  donepezil 5 milliGRAM(s) Oral at bedtime  furosemide    Tablet 40 milliGRAM(s) Oral daily  heparin  Injectable 5000 Unit(s) SubCutaneous every 12 hours  memantine 10 milliGRAM(s) Oral daily  metoprolol succinate ER 50 milliGRAM(s) Oral daily  pantoprazole    Tablet 40 milliGRAM(s) Oral two times a day  QUEtiapine 50 milliGRAM(s) Oral daily  QUEtiapine 100 milliGRAM(s) Oral at bedtime  saccharomyces boulardii 250 milliGRAM(s) Oral two times a day  tamsulosin 0.8 milliGRAM(s) Oral at bedtime    MEDICATIONS  (PRN):  bisacodyl Suppository 10 milliGRAM(s) Rectal daily PRN Constipation  ondansetron Injectable 4 milliGRAM(s) IV Push every 6 hours PRN Nausea and/or Vomiting

## 2018-06-04 PROCEDURE — 99232 SBSQ HOSP IP/OBS MODERATE 35: CPT

## 2018-06-04 RX ORDER — MEMANTINE HYDROCHLORIDE 10 MG/1
1 TABLET ORAL
Qty: 0 | Refills: 0 | COMMUNITY

## 2018-06-04 RX ORDER — METOPROLOL TARTRATE 50 MG
1 TABLET ORAL
Qty: 0 | Refills: 0 | COMMUNITY

## 2018-06-04 RX ORDER — TAMSULOSIN HYDROCHLORIDE 0.4 MG/1
1 CAPSULE ORAL
Qty: 0 | Refills: 0 | COMMUNITY

## 2018-06-04 RX ORDER — ATORVASTATIN CALCIUM 80 MG/1
80 TABLET, FILM COATED ORAL
Qty: 0 | Refills: 0 | COMMUNITY

## 2018-06-04 RX ORDER — QUETIAPINE FUMARATE 200 MG/1
50 TABLET, FILM COATED ORAL
Qty: 0 | Refills: 0 | COMMUNITY

## 2018-06-04 RX ORDER — DONEPEZIL HYDROCHLORIDE 10 MG/1
1 TABLET, FILM COATED ORAL
Qty: 0 | Refills: 0 | COMMUNITY

## 2018-06-04 RX ORDER — FUROSEMIDE 40 MG
1 TABLET ORAL
Qty: 0 | Refills: 0 | COMMUNITY

## 2018-06-04 RX ORDER — LACTULOSE 10 G/15ML
10 SOLUTION ORAL
Qty: 0 | Refills: 0 | Status: DISCONTINUED | OUTPATIENT
Start: 2018-06-04 | End: 2018-06-08

## 2018-06-04 RX ORDER — ATORVASTATIN CALCIUM 80 MG/1
1 TABLET, FILM COATED ORAL
Qty: 0 | Refills: 0 | COMMUNITY

## 2018-06-04 RX ORDER — DIVALPROEX SODIUM 500 MG/1
1 TABLET, DELAYED RELEASE ORAL
Qty: 0 | Refills: 0 | COMMUNITY

## 2018-06-04 RX ORDER — MAGNESIUM OXIDE 400 MG ORAL TABLET 241.3 MG
400 TABLET ORAL
Qty: 0 | Refills: 0 | Status: COMPLETED | OUTPATIENT
Start: 2018-06-04 | End: 2018-06-05

## 2018-06-04 RX ORDER — QUETIAPINE FUMARATE 200 MG/1
100 TABLET, FILM COATED ORAL
Qty: 0 | Refills: 0 | COMMUNITY

## 2018-06-04 RX ORDER — ASCORBIC ACID 60 MG
1 TABLET,CHEWABLE ORAL
Qty: 0 | Refills: 0 | COMMUNITY

## 2018-06-04 RX ADMIN — MAGNESIUM OXIDE 400 MG ORAL TABLET 400 MILLIGRAM(S): 241.3 TABLET ORAL at 12:16

## 2018-06-04 RX ADMIN — Medication 100 MILLIGRAM(S): at 22:44

## 2018-06-04 RX ADMIN — Medication 3 MILLILITER(S): at 08:15

## 2018-06-04 RX ADMIN — LACTULOSE 10 GRAM(S): 10 SOLUTION ORAL at 17:02

## 2018-06-04 RX ADMIN — Medication 3 MILLILITER(S): at 15:19

## 2018-06-04 RX ADMIN — TAMSULOSIN HYDROCHLORIDE 0.8 MILLIGRAM(S): 0.4 CAPSULE ORAL at 22:44

## 2018-06-04 RX ADMIN — HEPARIN SODIUM 5000 UNIT(S): 5000 INJECTION INTRAVENOUS; SUBCUTANEOUS at 17:00

## 2018-06-04 RX ADMIN — Medication 50 MILLIGRAM(S): at 06:05

## 2018-06-04 RX ADMIN — Medication 3 MILLILITER(S): at 03:28

## 2018-06-04 RX ADMIN — Medication 3 MILLILITER(S): at 21:22

## 2018-06-04 RX ADMIN — Medication 40 MILLIGRAM(S): at 06:05

## 2018-06-04 RX ADMIN — PANTOPRAZOLE SODIUM 40 MILLIGRAM(S): 20 TABLET, DELAYED RELEASE ORAL at 06:06

## 2018-06-04 RX ADMIN — DONEPEZIL HYDROCHLORIDE 5 MILLIGRAM(S): 10 TABLET, FILM COATED ORAL at 22:44

## 2018-06-04 RX ADMIN — Medication 250 MILLIGRAM(S): at 06:05

## 2018-06-04 RX ADMIN — QUETIAPINE FUMARATE 150 MILLIGRAM(S): 200 TABLET, FILM COATED ORAL at 22:43

## 2018-06-04 RX ADMIN — Medication 100 MILLIGRAM(S): at 12:16

## 2018-06-04 RX ADMIN — Medication 100 MILLIGRAM(S): at 06:05

## 2018-06-04 RX ADMIN — Medication 250 MILLIGRAM(S): at 17:02

## 2018-06-04 RX ADMIN — ATORVASTATIN CALCIUM 80 MILLIGRAM(S): 80 TABLET, FILM COATED ORAL at 22:44

## 2018-06-04 RX ADMIN — SENNA PLUS 2 TABLET(S): 8.6 TABLET ORAL at 22:44

## 2018-06-04 RX ADMIN — MAGNESIUM OXIDE 400 MG ORAL TABLET 400 MILLIGRAM(S): 241.3 TABLET ORAL at 17:00

## 2018-06-04 RX ADMIN — PANTOPRAZOLE SODIUM 40 MILLIGRAM(S): 20 TABLET, DELAYED RELEASE ORAL at 17:01

## 2018-06-04 RX ADMIN — MEMANTINE HYDROCHLORIDE 10 MILLIGRAM(S): 10 TABLET ORAL at 17:00

## 2018-06-04 RX ADMIN — QUETIAPINE FUMARATE 50 MILLIGRAM(S): 200 TABLET, FILM COATED ORAL at 17:02

## 2018-06-04 RX ADMIN — HEPARIN SODIUM 5000 UNIT(S): 5000 INJECTION INTRAVENOUS; SUBCUTANEOUS at 06:05

## 2018-06-04 RX ADMIN — LACTULOSE 10 GRAM(S): 10 SOLUTION ORAL at 23:45

## 2018-06-04 NOTE — PROGRESS NOTE ADULT - ASSESSMENT
Patient is an 83 y/o M with a prior admission to [facility] for a hip fracture and subsequently transferred to a rehabilitation facility was found to have fever, altered mental status, and urine retention. He was transferred to [facility] and a urinary catheter was attempted unsuccessfully. The patient was then transferred to [facility] for further management. The patient was seen by Urology and a urinary catheter was inserted with the initiation of continuous bladder irrigation for hematuria. Intravenous antibiotics were initiated for sepsis and urinary tract infection. Lower extremity Doppler was without DVT. The patient was noted to be disoriented and pulling at devices requiring constant observation. The patient’s family reported that he was previously been treated with quetiapine but was later started on divalproex at the rehabilitation facility and quetiapine was restarted. CT of the head noted moderate cerebral volume loss, no gross intracranial hemorrhage, intracranial mass, midline shift, or acute stroke. The patient had improvement in the lethargy but continued to be disoriented. Repeat laboratory studies noted improvement in the kidney function and furosemide was restarted. The patient’s wife was met by Palliative Care to discuss the patient’s decline in functional status since his prior hip surgery one year prior. The patient completed the course of antibiotics.    1)UTI initially presenting with sepsis which is now resolved. Presented with toxic metabolic encephalopathy with underlying dementia   -s/p zosyn tx   -leucocytosis noted, no fevers, will monitor labs in am  -urine culture negative   -d/c cbi     Presenting with urinary retention - seen by Urology in ER, early placed - continue with early for 1 week until f/up   -c/w flomax in the interim     2) ALBERTO likely due to some degree of ATN with low flow state   -cr now normalized. will monitor     3) Dementia with decline   -seen by palliative care   -ct head noted   -c/w quetiapine, donepezil and memantine   -currently on 1:1 in the interim to ensure that he does not interfere with medical care (aka early)    4) Htn - well controlled with cad   -c/w metoprolol   -c/w statin     5) Hypoxemia with interval development of heart failure per xray imaging along with LE edema   -lasix was stopped on prior admission   -resume lasix and titrate down from O2   -duplex negative   -patient does not have a hx of heart failure documented. Given his decline, would tx symptomatically and avoid further testing that is not likely to change overall management     6) Severe protein calorie malnutrition - Nutrition evaluation noted. Encouraged oral intake.    7) DVT ppx: SQH started for now. d/c CBI and maintain early     9)Dispo: Dnr/i. prognosis guarded

## 2018-06-04 NOTE — CHART NOTE - NSCHARTNOTEFT_GEN_A_CORE
Source: Patient [ ]  Family [ ]   other [ x]    Current Diet:  DASH    Patient reports [ ] nausea  [ ] vomiting [ ] diarrhea [ ] constipation  [ ]chewing problems [ ] swallowing issues  [ ] other:     PO intake:  < 50% [x ]   50-75%  [ ]   %  [ ]  other :    Source for PO intake [ ] Patient [ ] family [ ] chart [ ] staff [ ] other    Enteral /Parenteral Nutrition:     Current Weight: 104.5 kg    % Weight Change     Pertinent Medications: MEDICATIONS  (STANDING):  ALBUTerol/ipratropium for Nebulization 3 milliLiter(s) Nebulizer every 6 hours  atorvastatin 80 milliGRAM(s) Oral at bedtime  docusate sodium 100 milliGRAM(s) Oral three times a day  donepezil 5 milliGRAM(s) Oral at bedtime  furosemide    Tablet 40 milliGRAM(s) Oral daily  heparin  Injectable 5000 Unit(s) SubCutaneous every 12 hours  lactulose Syrup 10 Gram(s) Oral four times a day  magnesium oxide 400 milliGRAM(s) Oral three times a day with meals  memantine 10 milliGRAM(s) Oral daily  metoprolol succinate ER 50 milliGRAM(s) Oral daily  pantoprazole    Tablet 40 milliGRAM(s) Oral two times a day  polyethylene glycol 3350 17 Gram(s) Oral daily  QUEtiapine 150 milliGRAM(s) Oral at bedtime  QUEtiapine 50 milliGRAM(s) Oral daily  saccharomyces boulardii 250 milliGRAM(s) Oral two times a day  senna 2 Tablet(s) Oral at bedtime  tamsulosin 0.8 milliGRAM(s) Oral at bedtime    MEDICATIONS  (PRN):  bisacodyl Suppository 10 milliGRAM(s) Rectal daily PRN Constipation  ondansetron Injectable 4 milliGRAM(s) IV Push every 6 hours PRN Nausea and/or Vomiting    Pertinent Labs: CBC Full  -  ( 03 Jun 2018 08:30 )  WBC Count : 8.4 K/uL  Hemoglobin : 8.0 g/dL  Hematocrit : 26.6 %  Platelet Count - Automated : 276 K/uL  Mean Cell Volume : 95.7 fl  Mean Cell Hemoglobin : 28.8 pg  Mean Cell Hemoglobin Concentration : 30.1 g/dL  Auto Neutrophil # : x  Auto Lymphocyte # : x  Auto Monocyte # : x  Auto Eosinophil # : x  Auto Basophil # : x  Auto Neutrophil % : x  Auto Lymphocyte % : x  Auto Monocyte % : x  Auto Eosinophil % : x  Auto Basophil % : x          Skin:     Nutrition focused physical exam conducted - found signs of malnutrition [ ]absent [ x]present    Subcutaneous fat loss:  ] Orbital fat pads region, [x]Buccal fat region, [ ]Triceps region,  [ ]Ribs region    Muscle wasting: [ ]Temples region, [x ]Clavicle region, [ x]Shoulder region, [ ]Scapula region, [ ]Interosseous region,  [ ]thigh region, [ ]Calf region    Estimated Needs:   [ ] no change since previous assessment  [ ] recalculated:     Current Nutrition Diagnosis: · Nutrition Diagnostic Terminology #1: Nutrient  · Nutrient: Malnutrition; severe (acute)  · Etiology: related to inadequate protein energy intake with confusion  · Signs/Symptoms: as evidenced by pt meeting <50% est needs > 5 days and mild/moderate muscle wasting      Recommendations:   1) ensure TID  2) consider appetite stimulating med  3)     Monitoring and Evaluation:   [ ] PO intake [ ] Tolerance to diet prescription [X] Weights  [X] Follow up per protocol [X] Labs:

## 2018-06-04 NOTE — PROGRESS NOTE ADULT - SUBJECTIVE AND OBJECTIVE BOX
is an 84y old  Male who presents with a chief complaint of inability to place early. He has recovered from a UTI. He has constipaton.     He is still a little confused/delirious but much better. I've d/cd his 1:1. His abdomen still feels firm and he hasn't had a bm, I'm going to give him lactulose QID until he has a bm.    Summary:   REVIEW OF SYSTEMS    General:	"I'm doing okay."    Skin/Breast:  	  Ophthalmologic:  	  ENMT:	    Respiratory and Thorax:  	  Cardiovascular:	    Gastrointestinal:	    Genitourinary:	    Musculoskeletal:	    Neurological:	    Psychiatric:	    Hematology/Lymphatics:	    Endocrine:	    Allergic/Immunologic:	  Vital Signs Last 24 Hrs  Vital Signs Last 24 Hrs  T(C): 36.7 (04 Jun 2018 08:27), Max: 37.1 (03 Jun 2018 16:04)  T(F): 98.1 (04 Jun 2018 08:27), Max: 98.7 (03 Jun 2018 16:04)  HR: 89 (04 Jun 2018 08:27) (74 - 90)  BP: 126/65 (04 Jun 2018 08:27) (118/59 - 129/66)  BP(mean): --  RR: 18 (04 Jun 2018 08:27) (16 - 18)  SpO2: 95% (04 Jun 2018 09:04) (92% - 99%)  PHYSICAL EXAM:  GEN: NAD, comfortable, awake, alert, speaking full sentences, a little confused  HEENT: dry MM  CVS: S1S2 RRR  PULM: good breath sounds  ABD: soft, slightly distended, no tenderness, no peritoneal signs  EXTREM: no edema  NEURO: intact  PSYCH: mentating  SKIN: warm, pink                              8.0    8.4   )-----------( 276      ( 03 Jun 2018 08:30 )             26.6     06-03    145  |  106  |  17.0  ----------------------------<  115  4.0   |  28.0  |  0.89    Ca    8.5<L>      03 Jun 2018 08:30  Phos  3.3     06-03  Mg     1.7     06-03    Radiology:     MEDICATIONS  (STANDING):  ALBUTerol/ipratropium for Nebulization 3 milliLiter(s) Nebulizer every 6 hours  atorvastatin 80 milliGRAM(s) Oral at bedtime  docusate sodium 100 milliGRAM(s) Oral three times a day  donepezil 5 milliGRAM(s) Oral at bedtime  furosemide    Tablet 40 milliGRAM(s) Oral daily  heparin  Injectable 5000 Unit(s) SubCutaneous every 12 hours  lactulose Syrup 10 Gram(s) Oral four times a day  magnesium oxide 400 milliGRAM(s) Oral three times a day with meals  memantine 10 milliGRAM(s) Oral daily  metoprolol succinate ER 50 milliGRAM(s) Oral daily  pantoprazole    Tablet 40 milliGRAM(s) Oral two times a day  polyethylene glycol 3350 17 Gram(s) Oral daily  QUEtiapine 150 milliGRAM(s) Oral at bedtime  QUEtiapine 50 milliGRAM(s) Oral daily  saccharomyces boulardii 250 milliGRAM(s) Oral two times a day  senna 2 Tablet(s) Oral at bedtime  tamsulosin 0.8 milliGRAM(s) Oral at bedtime    MEDICATIONS  (PRN):  bisacodyl Suppository 10 milliGRAM(s) Rectal daily PRN Constipation  ondansetron Injectable 4 milliGRAM(s) IV Push every 6 hours PRN Nausea and/or Vomiting

## 2018-06-04 NOTE — PROGRESS NOTE ADULT - ASSESSMENT
Patient is an 85 y/o M with a prior admission to [facility] for a hip fracture and subsequently transferred to a rehabilitation facility was found to have fever, altered mental status, and urine retention. He was transferred to [facility] and a urinary catheter was attempted unsuccessfully. The patient was then transferred to [facility] for further management. The patient was seen by Urology and a urinary catheter was inserted with the initiation of continuous bladder irrigation for hematuria. Intravenous antibiotics were initiated for sepsis and urinary tract infection. Lower extremity Doppler was without DVT. The patient was noted to be disoriented and pulling at devices requiring constant observation. The patient’s family reported that he was previously been treated with quetiapine but was later started on divalproex at the rehabilitation facility and quetiapine was restarted. CT of the head noted moderate cerebral volume loss, no gross intracranial hemorrhage, intracranial mass, midline shift, or acute stroke. The patient had improvement in the lethargy but continued to be disoriented. Repeat laboratory studies noted improvement in the kidney function and furosemide was restarted. The patient’s wife was met by Palliative Care to discuss the patient’s decline in functional status since his prior hip surgery one year prior. The patient completed the course of antibiotics.    1)UTI initially presenting with sepsis which is now resolved. Presented with toxic metabolic encephalopathy with underlying dementia   -s/p zosyn tx   -leucocytosis noted, no fevers, will monitor labs in am  -urine culture negative   -d/c cbi     Presenting with urinary retention - seen by Urology in ER, early placed - continue with early for 1 week until f/up   -c/w flomax in the interim     2) ALBERTO likely due to some degree of ATN with low flow state   -cr now normalized. will monitor     3) Dementia with decline   -seen by palliative care   -ct head noted   -c/w quetiapine, donepezil and memantine   -currently on 1:1 in the interim to ensure that he does not interfere with medical care (aka early)    4) Htn - well controlled with cad   -c/w metoprolol   -c/w statin     5) Hypoxemia with interval development of heart failure per xray imaging along with LE edema   -lasix was stopped on prior admission   -resume lasix and titrate down from O2   -duplex negative   -patient does not have a hx of heart failure documented. Given his decline, would tx symptomatically and avoid further testing that is not likely to change overall management     6) Severe protein calorie malnutrition - Nutrition evaluation noted. Encouraged oral intake.    7) DVT ppx: SQH started for now. d/c CBI and maintain early     9)Dispo: Dnr/i. prognosis guarded

## 2018-06-05 LAB
ANION GAP SERPL CALC-SCNC: 12 MMOL/L — SIGNIFICANT CHANGE UP (ref 5–17)
BUN SERPL-MCNC: 14 MG/DL — SIGNIFICANT CHANGE UP (ref 8–20)
CALCIUM SERPL-MCNC: 8.6 MG/DL — SIGNIFICANT CHANGE UP (ref 8.6–10.2)
CHLORIDE SERPL-SCNC: 103 MMOL/L — SIGNIFICANT CHANGE UP (ref 98–107)
CO2 SERPL-SCNC: 32 MMOL/L — HIGH (ref 22–29)
CREAT SERPL-MCNC: 0.73 MG/DL — SIGNIFICANT CHANGE UP (ref 0.5–1.3)
FERRITIN SERPL-MCNC: 267 NG/ML — SIGNIFICANT CHANGE UP (ref 30–400)
FOLATE SERPL-MCNC: 15 NG/ML — SIGNIFICANT CHANGE UP
GLUCOSE SERPL-MCNC: 138 MG/DL — HIGH (ref 70–115)
HCT VFR BLD CALC: 28 % — LOW (ref 42–52)
HGB BLD-MCNC: 8.6 G/DL — LOW (ref 14–18)
IRON SATN MFR SERPL: 15 % — LOW (ref 16–55)
IRON SATN MFR SERPL: 29 UG/DL — LOW (ref 59–158)
MAGNESIUM SERPL-MCNC: 1.8 MG/DL — SIGNIFICANT CHANGE UP (ref 1.6–2.6)
MCHC RBC-ENTMCNC: 29.1 PG — SIGNIFICANT CHANGE UP (ref 27–31)
MCHC RBC-ENTMCNC: 30.7 G/DL — LOW (ref 32–36)
MCV RBC AUTO: 94.6 FL — HIGH (ref 80–94)
PHOSPHATE SERPL-MCNC: 3.3 MG/DL — SIGNIFICANT CHANGE UP (ref 2.4–4.7)
PLATELET # BLD AUTO: 286 K/UL — SIGNIFICANT CHANGE UP (ref 150–400)
POTASSIUM SERPL-MCNC: 3.6 MMOL/L — SIGNIFICANT CHANGE UP (ref 3.5–5.3)
POTASSIUM SERPL-SCNC: 3.6 MMOL/L — SIGNIFICANT CHANGE UP (ref 3.5–5.3)
RBC # BLD: 2.96 M/UL — LOW (ref 4.6–6.2)
RBC # FLD: 14.4 % — SIGNIFICANT CHANGE UP (ref 11–15.6)
SODIUM SERPL-SCNC: 147 MMOL/L — HIGH (ref 135–145)
TIBC SERPL-MCNC: 193 UG/DL — LOW (ref 220–430)
TRANSFERRIN SERPL-MCNC: 135 MG/DL — LOW (ref 180–329)
VIT B12 SERPL-MCNC: 1343 PG/ML — HIGH (ref 232–1245)
WBC # BLD: 8.4 K/UL — SIGNIFICANT CHANGE UP (ref 4.8–10.8)
WBC # FLD AUTO: 8.4 K/UL — SIGNIFICANT CHANGE UP (ref 4.8–10.8)

## 2018-06-05 PROCEDURE — 99232 SBSQ HOSP IP/OBS MODERATE 35: CPT

## 2018-06-05 RX ORDER — POTASSIUM CHLORIDE 20 MEQ
40 PACKET (EA) ORAL ONCE
Qty: 0 | Refills: 0 | Status: COMPLETED | OUTPATIENT
Start: 2018-06-05 | End: 2018-06-05

## 2018-06-05 RX ORDER — MAGNESIUM SULFATE 500 MG/ML
2 VIAL (ML) INJECTION ONCE
Qty: 0 | Refills: 0 | Status: COMPLETED | OUTPATIENT
Start: 2018-06-05 | End: 2018-06-05

## 2018-06-05 RX ORDER — IRON SUCROSE 20 MG/ML
200 INJECTION, SOLUTION INTRAVENOUS EVERY 24 HOURS
Qty: 0 | Refills: 0 | Status: COMPLETED | OUTPATIENT
Start: 2018-06-05 | End: 2018-06-07

## 2018-06-05 RX ADMIN — MAGNESIUM OXIDE 400 MG ORAL TABLET 400 MILLIGRAM(S): 241.3 TABLET ORAL at 11:41

## 2018-06-05 RX ADMIN — IRON SUCROSE 110 MILLIGRAM(S): 20 INJECTION, SOLUTION INTRAVENOUS at 14:09

## 2018-06-05 RX ADMIN — QUETIAPINE FUMARATE 150 MILLIGRAM(S): 200 TABLET, FILM COATED ORAL at 21:35

## 2018-06-05 RX ADMIN — LACTULOSE 10 GRAM(S): 10 SOLUTION ORAL at 05:09

## 2018-06-05 RX ADMIN — Medication 50 GRAM(S): at 14:09

## 2018-06-05 RX ADMIN — Medication 100 MILLIGRAM(S): at 05:09

## 2018-06-05 RX ADMIN — Medication 100 MILLIGRAM(S): at 21:36

## 2018-06-05 RX ADMIN — ATORVASTATIN CALCIUM 80 MILLIGRAM(S): 80 TABLET, FILM COATED ORAL at 21:35

## 2018-06-05 RX ADMIN — Medication 40 MILLIGRAM(S): at 05:09

## 2018-06-05 RX ADMIN — Medication 250 MILLIGRAM(S): at 05:09

## 2018-06-05 RX ADMIN — TAMSULOSIN HYDROCHLORIDE 0.8 MILLIGRAM(S): 0.4 CAPSULE ORAL at 21:35

## 2018-06-05 RX ADMIN — Medication 3 MILLILITER(S): at 20:35

## 2018-06-05 RX ADMIN — PANTOPRAZOLE SODIUM 40 MILLIGRAM(S): 20 TABLET, DELAYED RELEASE ORAL at 05:09

## 2018-06-05 RX ADMIN — MEMANTINE HYDROCHLORIDE 10 MILLIGRAM(S): 10 TABLET ORAL at 11:41

## 2018-06-05 RX ADMIN — SENNA PLUS 2 TABLET(S): 8.6 TABLET ORAL at 21:35

## 2018-06-05 RX ADMIN — LACTULOSE 10 GRAM(S): 10 SOLUTION ORAL at 11:39

## 2018-06-05 RX ADMIN — QUETIAPINE FUMARATE 50 MILLIGRAM(S): 200 TABLET, FILM COATED ORAL at 11:41

## 2018-06-05 RX ADMIN — Medication 3 MILLILITER(S): at 08:52

## 2018-06-05 RX ADMIN — POLYETHYLENE GLYCOL 3350 17 GRAM(S): 17 POWDER, FOR SOLUTION ORAL at 11:40

## 2018-06-05 RX ADMIN — DONEPEZIL HYDROCHLORIDE 5 MILLIGRAM(S): 10 TABLET, FILM COATED ORAL at 21:35

## 2018-06-05 RX ADMIN — Medication 50 MILLIGRAM(S): at 05:09

## 2018-06-05 RX ADMIN — HEPARIN SODIUM 5000 UNIT(S): 5000 INJECTION INTRAVENOUS; SUBCUTANEOUS at 05:09

## 2018-06-05 RX ADMIN — Medication 3 MILLILITER(S): at 15:53

## 2018-06-05 RX ADMIN — Medication 100 MILLIGRAM(S): at 11:41

## 2018-06-05 NOTE — PROGRESS NOTE ADULT - SUBJECTIVE AND OBJECTIVE BOX
is an 84y old  Male who presents with a chief complaint of inability to place early. He has recovered from a UTI. He has constipaton.     His delirium and agitation has improved significantly with seroquel. He's on senna, colace, miralax, lactulose and bisacodyl to treat his constipation. I've recommended that he be d/cd to [facility] to a place that allows seroquel to be used.     Summary:   REVIEW OF SYSTEMS    General:	"I'm doing okay."    Skin/Breast:  	  Ophthalmologic:  	  ENMT:	    Respiratory and Thorax:  	  Cardiovascular:	    Gastrointestinal:	    Genitourinary:	    Musculoskeletal:	    Neurological:	    Psychiatric:	    Hematology/Lymphatics:	    Endocrine:	    Allergic/Immunologic:	  Vital Signs Last 24 Hrs  T(C): 36.9 (05 Jun 2018 07:52), Max: 37 (05 Jun 2018 01:31)  T(F): 98.4 (05 Jun 2018 07:52), Max: 98.6 (05 Jun 2018 01:31)  HR: 74 (05 Jun 2018 08:54) (69 - 93)  BP: 148/77 (05 Jun 2018 07:52) (104/56 - 153/83)  BP(mean): --  RR: 18 (05 Jun 2018 07:52) (18 - 19)  SpO2: 82% (05 Jun 2018 08:54) (82% - 96%)  PHYSICAL EXAM:  GEN: NAD, comfortable, awake, alert, speaking full sentences, calm  HEENT: dry MM  CVS: S1S2 RRR  PULM: good breath sounds  ABD: soft, slightly distended, no tenderness, no peritoneal signs  EXTREM: no edema  NEURO: intact  PSYCH: mentating  SKIN: warm, pink                                        8.6    8.4   )-----------( 286      ( 05 Jun 2018 08:09 )             28.0     06-05    147<H>  |  103  |  14.0  ----------------------------<  138<H>  3.6   |  32.0<H>  |  0.73    Ca    8.6      05 Jun 2018 08:06  Phos  3.3     06-05  Mg     1.8     06-05    Radiology:     MEDICATIONS  (STANDING):  ALBUTerol/ipratropium for Nebulization 3 milliLiter(s) Nebulizer every 6 hours  atorvastatin 80 milliGRAM(s) Oral at bedtime  docusate sodium 100 milliGRAM(s) Oral three times a day  donepezil 5 milliGRAM(s) Oral at bedtime  furosemide    Tablet 40 milliGRAM(s) Oral daily  heparin  Injectable 5000 Unit(s) SubCutaneous every 12 hours  iron sucrose IVPB 200 milliGRAM(s) IV Intermittent every 24 hours  lactulose Syrup 10 Gram(s) Oral four times a day  magnesium sulfate  IVPB 2 Gram(s) IV Intermittent once  memantine 10 milliGRAM(s) Oral daily  metoprolol succinate ER 50 milliGRAM(s) Oral daily  pantoprazole    Tablet 40 milliGRAM(s) Oral two times a day  polyethylene glycol 3350 17 Gram(s) Oral daily  potassium chloride   Powder 40 milliEquivalent(s) Oral once  QUEtiapine 150 milliGRAM(s) Oral at bedtime  QUEtiapine 50 milliGRAM(s) Oral daily  saccharomyces boulardii 250 milliGRAM(s) Oral two times a day  senna 2 Tablet(s) Oral at bedtime  tamsulosin 0.8 milliGRAM(s) Oral at bedtime    MEDICATIONS  (PRN):  bisacodyl Suppository 10 milliGRAM(s) Rectal daily PRN Constipation  ondansetron Injectable 4 milliGRAM(s) IV Push every 6 hours PRN Nausea and/or Vomiting

## 2018-06-05 NOTE — PROGRESS NOTE ADULT - ASSESSMENT
Patient is an 85 y/o M with a prior admission to [facility] for a hip fracture and subsequently transferred to a rehabilitation facility was found to have fever, altered mental status, and urine retention. He was transferred to [facility] and a urinary catheter was attempted unsuccessfully. The patient was then transferred to [facility] for further management. The patient was seen by Urology and a urinary catheter was inserted with the initiation of continuous bladder irrigation for hematuria. Intravenous antibiotics were initiated for sepsis and urinary tract infection. Lower extremity Doppler was without DVT. The patient was noted to be disoriented and pulling at devices requiring constant observation. The patient’s family reported that he was previously been treated with quetiapine but was later started on divalproex at the rehabilitation facility and quetiapine was restarted. CT of the head noted moderate cerebral volume loss, no gross intracranial hemorrhage, intracranial mass, midline shift, or acute stroke. The patient had improvement in the lethargy but continued to be disoriented. Repeat laboratory studies noted improvement in the kidney function and furosemide was restarted. The patient’s wife was met by Palliative Care to discuss the patient’s decline in functional status since his prior hip surgery one year prior. The patient completed the course of antibiotics.    1)UTI initially presenting with sepsis which is now resolved. Presented with toxic metabolic encephalopathy with underlying dementia   -s/p zosyn tx   -leucocytosis noted, no fevers, will monitor labs in am  -urine culture negative   -d/c cbi     Presenting with urinary retention - seen by Urology in ER, early placed - continue with early for 1 week until f/up   -c/w flomax in the interim     2) ALBERTO likely due to some degree of ATN with low flow state   -cr now normalized. will monitor     3) Dementia with decline   -seen by palliative care   -ct head noted   -c/w quetiapine, donepezil and memantine   -currently on 1:1 in the interim to ensure that he does not interfere with medical care (aka early)    4) Htn - well controlled with cad   -c/w metoprolol   -c/w statin     5) Hypoxemia with interval development of heart failure per xray imaging along with LE edema   -lasix was stopped on prior admission   -resume lasix and titrate down from O2   -duplex negative   -patient does not have a hx of heart failure documented. Given his decline, would tx symptomatically and avoid further testing that is not likely to change overall management     6) Severe protein calorie malnutrition - Nutrition evaluation noted. Encouraged oral intake.    7) DVT ppx: SQH started for now. d/c CBI and maintain early     9)Dispo: Dnr/i. prognosis guarded

## 2018-06-06 ENCOUNTER — TRANSCRIPTION ENCOUNTER (OUTPATIENT)
Age: 83
End: 2018-06-06

## 2018-06-06 PROCEDURE — 99233 SBSQ HOSP IP/OBS HIGH 50: CPT

## 2018-06-06 RX ORDER — AMLODIPINE BESYLATE 2.5 MG/1
1 TABLET ORAL
Qty: 0 | Refills: 0 | COMMUNITY

## 2018-06-06 RX ORDER — FERROUS SULFATE 325(65) MG
1 TABLET ORAL
Qty: 60 | Refills: 0 | OUTPATIENT
Start: 2018-06-06 | End: 2018-07-05

## 2018-06-06 RX ORDER — QUETIAPINE FUMARATE 200 MG/1
3 TABLET, FILM COATED ORAL
Qty: 0 | Refills: 0 | COMMUNITY
Start: 2018-06-06

## 2018-06-06 RX ORDER — OXYCODONE HYDROCHLORIDE 5 MG/1
1 TABLET ORAL
Qty: 0 | Refills: 0 | COMMUNITY

## 2018-06-06 RX ORDER — METOPROLOL TARTRATE 50 MG
1 TABLET ORAL
Qty: 0 | Refills: 0 | COMMUNITY

## 2018-06-06 RX ORDER — IPRATROPIUM/ALBUTEROL SULFATE 18-103MCG
3 AEROSOL WITH ADAPTER (GRAM) INHALATION
Qty: 0 | Refills: 0 | COMMUNITY
Start: 2018-06-06

## 2018-06-06 RX ORDER — AMLODIPINE BESYLATE 2.5 MG/1
2.5 TABLET ORAL DAILY
Qty: 0 | Refills: 0 | Status: DISCONTINUED | OUTPATIENT
Start: 2018-06-06 | End: 2018-06-08

## 2018-06-06 RX ORDER — DIVALPROEX SODIUM 500 MG/1
1 TABLET, DELAYED RELEASE ORAL
Qty: 0 | Refills: 0 | COMMUNITY

## 2018-06-06 RX ORDER — DOCUSATE SODIUM 100 MG
1 CAPSULE ORAL
Qty: 0 | Refills: 0 | COMMUNITY
Start: 2018-06-06

## 2018-06-06 RX ORDER — POLYETHYLENE GLYCOL 3350 17 G/17G
17 POWDER, FOR SOLUTION ORAL
Qty: 0 | Refills: 0 | COMMUNITY

## 2018-06-06 RX ORDER — ASCORBIC ACID 60 MG
500 TABLET,CHEWABLE ORAL DAILY
Qty: 0 | Refills: 0 | Status: DISCONTINUED | OUTPATIENT
Start: 2018-06-06 | End: 2018-06-08

## 2018-06-06 RX ORDER — QUETIAPINE FUMARATE 200 MG/1
1 TABLET, FILM COATED ORAL
Qty: 0 | Refills: 0 | COMMUNITY
Start: 2018-06-06

## 2018-06-06 RX ORDER — PANTOPRAZOLE SODIUM 20 MG/1
1 TABLET, DELAYED RELEASE ORAL
Qty: 0 | Refills: 0 | COMMUNITY
Start: 2018-06-06

## 2018-06-06 RX ORDER — METOPROLOL TARTRATE 50 MG
1 TABLET ORAL
Qty: 0 | Refills: 0 | COMMUNITY
Start: 2018-06-06

## 2018-06-06 RX ORDER — ENOXAPARIN SODIUM 100 MG/ML
40 INJECTION SUBCUTANEOUS
Qty: 0 | Refills: 0 | COMMUNITY

## 2018-06-06 RX ORDER — SENNA PLUS 8.6 MG/1
2 TABLET ORAL
Qty: 0 | Refills: 0 | COMMUNITY
Start: 2018-06-06

## 2018-06-06 RX ADMIN — HEPARIN SODIUM 5000 UNIT(S): 5000 INJECTION INTRAVENOUS; SUBCUTANEOUS at 05:39

## 2018-06-06 RX ADMIN — Medication 1 TABLET(S): at 13:44

## 2018-06-06 RX ADMIN — TAMSULOSIN HYDROCHLORIDE 0.8 MILLIGRAM(S): 0.4 CAPSULE ORAL at 22:50

## 2018-06-06 RX ADMIN — Medication 3 MILLILITER(S): at 15:49

## 2018-06-06 RX ADMIN — QUETIAPINE FUMARATE 150 MILLIGRAM(S): 200 TABLET, FILM COATED ORAL at 22:50

## 2018-06-06 RX ADMIN — SENNA PLUS 2 TABLET(S): 8.6 TABLET ORAL at 22:50

## 2018-06-06 RX ADMIN — AMLODIPINE BESYLATE 2.5 MILLIGRAM(S): 2.5 TABLET ORAL at 17:15

## 2018-06-06 RX ADMIN — HEPARIN SODIUM 5000 UNIT(S): 5000 INJECTION INTRAVENOUS; SUBCUTANEOUS at 13:44

## 2018-06-06 RX ADMIN — DONEPEZIL HYDROCHLORIDE 5 MILLIGRAM(S): 10 TABLET, FILM COATED ORAL at 22:50

## 2018-06-06 RX ADMIN — IRON SUCROSE 110 MILLIGRAM(S): 20 INJECTION, SOLUTION INTRAVENOUS at 17:16

## 2018-06-06 RX ADMIN — Medication 100 MILLIGRAM(S): at 22:50

## 2018-06-06 RX ADMIN — Medication 3 MILLILITER(S): at 02:35

## 2018-06-06 RX ADMIN — PANTOPRAZOLE SODIUM 40 MILLIGRAM(S): 20 TABLET, DELAYED RELEASE ORAL at 17:15

## 2018-06-06 RX ADMIN — Medication 40 MILLIGRAM(S): at 05:40

## 2018-06-06 RX ADMIN — QUETIAPINE FUMARATE 50 MILLIGRAM(S): 200 TABLET, FILM COATED ORAL at 11:32

## 2018-06-06 RX ADMIN — LACTULOSE 10 GRAM(S): 10 SOLUTION ORAL at 17:15

## 2018-06-06 RX ADMIN — LACTULOSE 10 GRAM(S): 10 SOLUTION ORAL at 11:30

## 2018-06-06 RX ADMIN — HEPARIN SODIUM 5000 UNIT(S): 5000 INJECTION INTRAVENOUS; SUBCUTANEOUS at 22:50

## 2018-06-06 RX ADMIN — POLYETHYLENE GLYCOL 3350 17 GRAM(S): 17 POWDER, FOR SOLUTION ORAL at 11:30

## 2018-06-06 RX ADMIN — Medication 500 MILLIGRAM(S): at 13:43

## 2018-06-06 RX ADMIN — LACTULOSE 10 GRAM(S): 10 SOLUTION ORAL at 22:51

## 2018-06-06 RX ADMIN — Medication 250 MILLIGRAM(S): at 05:40

## 2018-06-06 RX ADMIN — Medication 3 MILLILITER(S): at 08:21

## 2018-06-06 RX ADMIN — Medication 50 MILLIGRAM(S): at 05:39

## 2018-06-06 RX ADMIN — MEMANTINE HYDROCHLORIDE 10 MILLIGRAM(S): 10 TABLET ORAL at 11:31

## 2018-06-06 RX ADMIN — Medication 100 MILLIGRAM(S): at 05:40

## 2018-06-06 RX ADMIN — PANTOPRAZOLE SODIUM 40 MILLIGRAM(S): 20 TABLET, DELAYED RELEASE ORAL at 05:39

## 2018-06-06 RX ADMIN — ATORVASTATIN CALCIUM 80 MILLIGRAM(S): 80 TABLET, FILM COATED ORAL at 22:49

## 2018-06-06 RX ADMIN — Medication 100 MILLIGRAM(S): at 13:44

## 2018-06-06 RX ADMIN — Medication 3 MILLILITER(S): at 20:11

## 2018-06-06 NOTE — DISCHARGE NOTE ADULT - HOSPITAL COURSE
is an 84y old Male w/PMhx of dementia,  who presents with a chief complaint of inability to place early. He has recovered from a UTI. He has constipaton which resolved with laxatives.    His delirium and agitation has improved significantly with seroquel. He's on senna, colace, miralax, lactulose and bisacodyl to treat his constipation. I've recommended that he be d/cd to [facility] to a place that allows seroquel to be used.     His wife is in full agreement with the discharge plan and  can be discharged to [facility] today.  is an 84y old Male w/PMhx of dementia,  who presents with a chief complaint of inability to place early. He has recovered from a UTI. He has constipaton which resolved with laxatives.    His delirium and agitation has improved significantly with seroquel. He's on senna, colace, miralax, lactulose and bisacodyl to treat his constipation. I've recommended that he be d/cd to [facility] to a place that allows seroquel to be used. Seroquel has been better at controlling his agitation than depakote. He's very calm and relaxed and comfortable.     His wife is in full agreement with the discharge plan and  can be discharged to [facility] today.  is an 84y old Male w/PMhx of dementia,  who presents with a chief complaint of inability to place early. He has recovered from a UTI. He has constipaton which resolved with laxatives.    His delirium and agitation has improved significantly with seroquel. He's on senna, colace, miralax, lactulose and bisacodyl to treat his constipation. I've recommended that he be d/cd to [facility] to a place that allows seroquel to be used. Seroquel has been better at controlling his agitation than depakote. He's very calm and relaxed and comfortable. His urologist Dr.Edward Alonzo recommended that he be discharged with a early to [facility] and he will be able to f/u with urology as an outpatient.     His wife is in full agreement with the discharge plan and  can be discharged to [facility] today.

## 2018-06-06 NOTE — PHYSICAL THERAPY INITIAL EVALUATION ADULT - PERTINENT HX OF CURRENT PROBLEM, REHAB EVAL
Pt is an 83 y/o male who presented from [facility] with AMS. (+) UTI, encephalopathy. Pt has hx/o L hip fx.

## 2018-06-06 NOTE — DISCHARGE NOTE ADULT - MEDICATION SUMMARY - MEDICATIONS TO TAKE
I will START or STAY ON the medications listed below when I get home from the hospital:    Tylenol 325 mg oral tablet  -- 3 tab(s) by mouth every 8 hours, As Needed  -- Indication: For Prostate CA    tamsulosin 0.4 mg oral capsule  -- 1 cap(s) by mouth once a day  -- Indication: For bph    atorvastatin 80 mg oral tablet  -- 1 tab(s) by mouth once a day (at bedtime)  -- Indication: For Hyperlipidemia    QUEtiapine 50 mg oral tablet  -- 1 tab(s) by mouth once a day  -- Indication: For Agitation    QUEtiapine 50 mg oral tablet  -- 3 tab(s) by mouth once a day (at bedtime)  -- Indication: For Agitation    metoprolol succinate 50 mg oral tablet, extended release  -- 1 tab(s) by mouth once a day  -- Indication: For HTN (hypertension)    ipratropium-albuterol 0.5 mg-2.5 mg/3 mLinhalation solution  -- 3 milliliter(s) inhaled every 6 hours  -- Indication: For Asthma    amLODIPine 2.5 mg oral tablet  -- 1 tab(s) by mouth once a day  -- Indication: For HTN (hypertension)    Aricept 5 mg oral tablet  -- 1 tab(s) by mouth once a day  -- Indication: For Dementia    Lasix 40 mg oral tablet  -- 1 tab(s) by mouth once a day  -- Indication: For Diastolic dysfunction    ferrous sulfate 325 mg (65 mg elemental iron) oral delayed release tablet  -- 1 tab(s) by mouth 2 times a day MDD:2 tabs  -- May discolor urine or feces.  Swallow whole.  Do not crush.    -- Indication: For Supplementation    docusate sodium 100 mg oral capsule  -- 1 cap(s) by mouth 3 times a day  -- Indication: For Constipation    senna oral tablet  -- 2 tab(s) by mouth once a day (at bedtime)  -- Indication: For Constipation    MiraLax oral powder for reconstitution  -- 17 gram(s) by mouth once a day, As Needed  -- Indication: For Constipaton    Namenda 10 mg oral tablet  -- 1 tab(s) by mouth once a day  -- Indication: For Dementia    pantoprazole 40 mg oral delayed release tablet  -- 1 tab(s) by mouth 2 times a day  -- Indication: For gi prophylaxis    Multiple Vitamins oral tablet  -- 1 tab(s) by mouth once a day  -- Indication: For Supplementation    ascorbic acid 500 mg oral tablet  -- 1 tab(s) by mouth once a day  -- Indication: For Supplementation I will START or STAY ON the medications listed below when I get home from the hospital:    Tylenol 325 mg oral tablet  -- 2 tab(s) by mouth every 8 hours, As Needed  -- Indication: For Pain    tamsulosin 0.4 mg oral capsule  -- 1 cap(s) by mouth once a day  -- Indication: For bph    atorvastatin 80 mg oral tablet  -- 1 tab(s) by mouth once a day (at bedtime)  -- Indication: For Hyperlipidemia    QUEtiapine 50 mg oral tablet  -- 1 tab(s) by mouth once a day  -- Indication: For Agitation    QUEtiapine 50 mg oral tablet  -- 3 tab(s) by mouth once a day (at bedtime)  -- Indication: For Agitation    metoprolol succinate 50 mg oral tablet, extended release  -- 1 tab(s) by mouth once a day  -- Indication: For HTN (hypertension)    ipratropium-albuterol 0.5 mg-2.5 mg/3 mLinhalation solution  -- 3 milliliter(s) inhaled every 6 hours  -- Indication: For Asthma    amLODIPine 2.5 mg oral tablet  -- 1 tab(s) by mouth once a day  -- Indication: For HTN (hypertension)    Aricept 5 mg oral tablet  -- 1 tab(s) by mouth once a day  -- Indication: For Dementia    Lasix 40 mg oral tablet  -- 1 tab(s) by mouth once a day  -- Indication: For Diastolic dysfunction    ferrous sulfate 325 mg (65 mg elemental iron) oral delayed release tablet  -- 1 tab(s) by mouth 2 times a day MDD:2 tabs  -- May discolor urine or feces.  Swallow whole.  Do not crush.    -- Indication: For Supplementation    docusate sodium 100 mg oral capsule  -- 1 cap(s) by mouth 3 times a day  -- Indication: For Constipation    senna oral tablet  -- 2 tab(s) by mouth once a day (at bedtime)  -- Indication: For Constipation    MiraLax oral powder for reconstitution  -- 17 gram(s) by mouth once a day, As Needed  -- Indication: For Constipaton    Namenda 10 mg oral tablet  -- 1 tab(s) by mouth once a day  -- Indication: For Dementia    pantoprazole 40 mg oral delayed release tablet  -- 1 tab(s) by mouth 2 times a day  -- Indication: For gi prophylaxis    Multiple Vitamins oral tablet  -- 1 tab(s) by mouth once a day  -- Indication: For Supplementation    ascorbic acid 500 mg oral tablet  -- 1 tab(s) by mouth once a day  -- Indication: For Supplementation

## 2018-06-06 NOTE — PHYSICAL THERAPY INITIAL EVALUATION ADULT - PLANNED THERAPY INTERVENTIONS, PT EVAL
gait training/bed mobility training/strengthening/transfer training/postural re-education/balance training

## 2018-06-06 NOTE — CHART NOTE - NSCHARTNOTEFT_GEN_A_CORE
Source: Patient [ ]  Family [ ]   other [ ]    Current Diet: DASH/TLC    Patient reports [ ] nausea  [ ] vomiting [ ] diarrhea [ ] constipation  [ ]chewing problems [ ] swallowing issues  [ ] other:     PO intake:  < 50% [ x]   50-75%  [ ]   %  [ ]  other :    Source for PO intake [ ] Patient [ ] family [ ] chart [x ] staff [ ] other    Enteral /Parenteral Nutrition:     Current Weight: 104,5     % Weight Change     Pertinent Medications: MEDICATIONS  (STANDING):  ALBUTerol/ipratropium for Nebulization 3 milliLiter(s) Nebulizer every 6 hours  amLODIPine   Tablet 2.5 milliGRAM(s) Oral daily  ascorbic acid 500 milliGRAM(s) Oral daily  atorvastatin 80 milliGRAM(s) Oral at bedtime  docusate sodium 100 milliGRAM(s) Oral three times a day  donepezil 5 milliGRAM(s) Oral at bedtime  furosemide    Tablet 40 milliGRAM(s) Oral daily  heparin  Injectable 5000 Unit(s) SubCutaneous every 12 hours  iron sucrose IVPB 200 milliGRAM(s) IV Intermittent every 24 hours  lactulose Syrup 10 Gram(s) Oral four times a day  memantine 10 milliGRAM(s) Oral daily  metoprolol succinate ER 50 milliGRAM(s) Oral daily  multivitamin 1 Tablet(s) Oral daily  pantoprazole    Tablet 40 milliGRAM(s) Oral two times a day  polyethylene glycol 3350 17 Gram(s) Oral daily  QUEtiapine 150 milliGRAM(s) Oral at bedtime  QUEtiapine 50 milliGRAM(s) Oral daily  senna 2 Tablet(s) Oral at bedtime  tamsulosin 0.8 milliGRAM(s) Oral at bedtime    MEDICATIONS  (PRN):  bisacodyl Suppository 10 milliGRAM(s) Rectal daily PRN Constipation  ondansetron Injectable 4 milliGRAM(s) IV Push every 6 hours PRN Nausea and/or Vomiting    Pertinent Labs: CBC Full  -  ( 05 Jun 2018 08:09 )  WBC Count : 8.4 K/uL  Hemoglobin : 8.6 g/dL  Hematocrit : 28.0 %  Platelet Count - Automated : 286 K/uL  Mean Cell Volume : 94.6 fl  Mean Cell Hemoglobin : 29.1 pg  Mean Cell Hemoglobin Concentration : 30.7 g/dL  Auto Neutrophil # : x  Auto Lymphocyte # : x  Auto Monocyte # : x  Auto Eosinophil # : x  Auto Basophil # : x  Auto Neutrophil % : x  Auto Lymphocyte % : x  Auto Monocyte % : x  Auto Eosinophil % : x  Auto Basophil % : x          Skin:     Nutrition focused physical exam conducted - found signs of malnutrition [x ]absent [ ]present    Subcutaneous fat loss: [ ] Orbital fat pads region, [ ]Buccal fat region, [ ]Triceps region,  [ ]Ribs region    Muscle wasting: [ x]Temples region, [x ]Clavicle region, [ x]Shoulder region, [ ]Scapula region, [ ]Interosseous region,  [ ]thigh region, [ ]Calf region    Estimated Needs:   [ ] no change since previous assessment  [ ] recalculated:     Current Nutrition Diagnosis:      · Nutrient: Malnutrition; severe (acute)  · Etiology: related to inadequate protein energy intake with confusion  · Signs/Symptoms: as evidenced by pt meeting <50% est needs > 5 days and mild/moderate muscle wasting  aware of plan for d/c today    Recommendations:   1) ensure TID    Monitoring and Evaluation:   [x ] PO intake [x ] Tolerance to diet prescription [X] Weights  [X] Follow up per protocol [X] Labs:

## 2018-06-06 NOTE — DISCHARGE NOTE ADULT - MEDICATION SUMMARY - MEDICATIONS TO CHANGE
I will SWITCH the dose or number of times a day I take the medications listed below when I get home from the hospital:  None I will SWITCH the dose or number of times a day I take the medications listed below when I get home from the hospital:    Tylenol 325 mg oral tablet  -- 3 tab(s) by mouth every 8 hours, As Needed

## 2018-06-06 NOTE — PROGRESS NOTE ADULT - SUBJECTIVE AND OBJECTIVE BOX
is an 84y old  Male who presents with a chief complaint of inability to place early. He has recovered from a UTI. He has constipaton.     His delirium and agitation has improved significantly with seroquel. He's on senna, colace, miralax, lactulose and bisacodyl to treat his constipation. I've recommended that he be d/cd to [facility] to a place that allows seroquel to be used.     Summary:   REVIEW OF SYSTEMS    General:	"I'm doing okay."    Skin/Breast:  	  Ophthalmologic:  	  ENMT:	    Respiratory and Thorax:  	  Cardiovascular:	    Gastrointestinal:	    Genitourinary:	    Musculoskeletal:	    Neurological:	    Psychiatric:	    Hematology/Lymphatics:	    Endocrine:	    Allergic/Immunologic:	  Vital Signs Last 24 Hrs  Vital Signs Last 24 Hrs  T(C): 36.4 (06 Jun 2018 08:19), Max: 36.7 (05 Jun 2018 21:48)  T(F): 97.6 (06 Jun 2018 08:19), Max: 98.1 (05 Jun 2018 21:48)  HR: 68 (06 Jun 2018 15:50) (64 - 78)  BP: 135/67 (06 Jun 2018 08:19) (110/78 - 135/67)  BP(mean): --  RR: 18 (06 Jun 2018 08:19) (18 - 18)  SpO2: 96% (06 Jun 2018 08:23) (94% - 97%)  PHYSICAL EXAM:  GEN: NAD, comfortable, awake, alert, speaking full sentences, calm  HEENT: dry MM  CVS: S1S2 RRR  PULM: good breath sounds  ABD: soft, slightly distended, no tenderness, no peritoneal signs  EXTREM: no edema  NEURO: intact  PSYCH: mentating  SKIN: warm, pink                                                  8.6    8.4   )-----------( 286      ( 05 Jun 2018 08:09 )             28.0     06-05    147<H>  |  103  |  14.0  ----------------------------<  138<H>  3.6   |  32.0<H>  |  0.73    Ca    8.6      05 Jun 2018 08:06  Phos  3.3     06-05  Mg     1.8     06-05    Radiology:     MEDICATIONS  (STANDING):  ALBUTerol/ipratropium for Nebulization 3 milliLiter(s) Nebulizer every 6 hours  amLODIPine   Tablet 2.5 milliGRAM(s) Oral daily  ascorbic acid 500 milliGRAM(s) Oral daily  atorvastatin 80 milliGRAM(s) Oral at bedtime  docusate sodium 100 milliGRAM(s) Oral three times a day  donepezil 5 milliGRAM(s) Oral at bedtime  furosemide    Tablet 40 milliGRAM(s) Oral daily  heparin  Injectable 5000 Unit(s) SubCutaneous every 12 hours  iron sucrose IVPB 200 milliGRAM(s) IV Intermittent every 24 hours  lactulose Syrup 10 Gram(s) Oral four times a day  memantine 10 milliGRAM(s) Oral daily  metoprolol succinate ER 50 milliGRAM(s) Oral daily  multivitamin 1 Tablet(s) Oral daily  pantoprazole    Tablet 40 milliGRAM(s) Oral two times a day  polyethylene glycol 3350 17 Gram(s) Oral daily  QUEtiapine 150 milliGRAM(s) Oral at bedtime  QUEtiapine 50 milliGRAM(s) Oral daily  senna 2 Tablet(s) Oral at bedtime  tamsulosin 0.8 milliGRAM(s) Oral at bedtime    MEDICATIONS  (PRN):  bisacodyl Suppository 10 milliGRAM(s) Rectal daily PRN Constipation  ondansetron Injectable 4 milliGRAM(s) IV Push every 6 hours PRN Nausea and/or Vomiting

## 2018-06-06 NOTE — DISCHARGE NOTE ADULT - CARE PROVIDER_API CALL
Jerrell Alonzo), Urology  [address]  [address]  [place] [address]  Phone: [phone]  Fax: [phone]

## 2018-06-06 NOTE — PROGRESS NOTE ADULT - ASSESSMENT
Patient is an 85 y/o M with a prior admission to [facility] for a hip fracture and subsequently transferred to a rehabilitation facility was found to have fever, altered mental status, and urine retention. He was transferred to [facility] and a urinary catheter was attempted unsuccessfully. The patient was then transferred to [facility] for further management. The patient was seen by Urology and a urinary catheter was inserted with the initiation of continuous bladder irrigation for hematuria. Intravenous antibiotics were initiated for sepsis and urinary tract infection. Lower extremity Doppler was without DVT. The patient was noted to be disoriented and pulling at devices requiring constant observation. The patient’s family reported that he was previously been treated with quetiapine but was later started on divalproex at the rehabilitation facility and quetiapine was restarted. CT of the head noted moderate cerebral volume loss, no gross intracranial hemorrhage, intracranial mass, midline shift, or acute stroke. The patient had improvement in the lethargy but continued to be disoriented. Repeat laboratory studies noted improvement in the kidney function and furosemide was restarted. The patient’s wife was met by Palliative Care to discuss the patient’s decline in functional status since his prior hip surgery one year prior. The patient completed the course of antibiotics.    1)UTI initially presenting with sepsis which is now resolved. Presented with toxic metabolic encephalopathy with underlying dementia   -s/p zosyn tx   -leucocytosis noted, no fevers, will monitor labs in am  -urine culture negative   -d/c cbi     Presenting with urinary retention - seen by Urology in ER, early placed - continue with early for 1 week until f/up   -c/w flomax in the interim     2) ALBERTO likely due to some degree of ATN with low flow state   -cr now normalized. will monitor     3) Dementia with decline   -seen by palliative care   -ct head noted   -c/w quetiapine, donepezil and memantine   -currently on 1:1 in the interim to ensure that he does not interfere with medical care (aka early)    4) Htn - well controlled with cad   -c/w metoprolol   -c/w statin     5) Hypoxemia with interval development of heart failure per xray imaging along with LE edema   -lasix was stopped on prior admission   -resume lasix and titrate down from O2   -duplex negative   -patient does not have a hx of heart failure documented. Given his decline, would tx symptomatically and avoid further testing that is not likely to change overall management     6) Severe protein calorie malnutrition - Nutrition evaluation noted. Encouraged oral intake.    7) DVT ppx: SQH started for now. d/c CBI and maintain early     9)Dispo: Dnr/i. prognosis guarded

## 2018-06-06 NOTE — DISCHARGE NOTE ADULT - CARE PLAN
Principal Discharge DX:	Acute cystitis without hematuria  Goal:	Please perform PT and please take seroquel daily for agitation  Assessment and plan of treatment:	Please f/u with PMD, please take laxatives prn as needed  Secondary Diagnosis:	Delirium

## 2018-06-06 NOTE — PHYSICAL THERAPY INITIAL EVALUATION ADULT - ADDITIONAL COMMENTS
Per spouse; prior to first hospitalization pt was independent and did not use an AD. They live in a house with a ramp to enter and no steps inside.

## 2018-06-06 NOTE — DISCHARGE NOTE ADULT - PLAN OF CARE
Please perform PT and please take seroquel daily for agitation Please f/u with PMD, please take laxatives prn as needed

## 2018-06-06 NOTE — DISCHARGE NOTE ADULT - PATIENT PORTAL LINK FT
You can access the [facility] Portal, offered by [facility], by registering with the following website: [email or web address]

## 2018-06-07 DIAGNOSIS — N40.1 BENIGN PROSTATIC HYPERPLASIA WITH LOWER URINARY TRACT SYMPTOMS: ICD-10-CM

## 2018-06-07 PROCEDURE — 99232 SBSQ HOSP IP/OBS MODERATE 35: CPT

## 2018-06-07 PROCEDURE — 99231 SBSQ HOSP IP/OBS SF/LOW 25: CPT

## 2018-06-07 RX ORDER — TAMSULOSIN HYDROCHLORIDE 0.4 MG/1
0.4 CAPSULE ORAL AT BEDTIME
Qty: 0 | Refills: 0 | Status: DISCONTINUED | OUTPATIENT
Start: 2018-06-07 | End: 2018-06-07

## 2018-06-07 RX ADMIN — PANTOPRAZOLE SODIUM 40 MILLIGRAM(S): 20 TABLET, DELAYED RELEASE ORAL at 17:31

## 2018-06-07 RX ADMIN — QUETIAPINE FUMARATE 150 MILLIGRAM(S): 200 TABLET, FILM COATED ORAL at 21:55

## 2018-06-07 RX ADMIN — QUETIAPINE FUMARATE 50 MILLIGRAM(S): 200 TABLET, FILM COATED ORAL at 12:28

## 2018-06-07 RX ADMIN — Medication 1 TABLET(S): at 12:28

## 2018-06-07 RX ADMIN — AMLODIPINE BESYLATE 2.5 MILLIGRAM(S): 2.5 TABLET ORAL at 06:17

## 2018-06-07 RX ADMIN — Medication 3 MILLILITER(S): at 08:14

## 2018-06-07 RX ADMIN — Medication 100 MILLIGRAM(S): at 06:16

## 2018-06-07 RX ADMIN — HEPARIN SODIUM 5000 UNIT(S): 5000 INJECTION INTRAVENOUS; SUBCUTANEOUS at 06:16

## 2018-06-07 RX ADMIN — PANTOPRAZOLE SODIUM 40 MILLIGRAM(S): 20 TABLET, DELAYED RELEASE ORAL at 06:16

## 2018-06-07 RX ADMIN — POLYETHYLENE GLYCOL 3350 17 GRAM(S): 17 POWDER, FOR SOLUTION ORAL at 12:28

## 2018-06-07 RX ADMIN — IRON SUCROSE 110 MILLIGRAM(S): 20 INJECTION, SOLUTION INTRAVENOUS at 17:30

## 2018-06-07 RX ADMIN — LACTULOSE 10 GRAM(S): 10 SOLUTION ORAL at 12:27

## 2018-06-07 RX ADMIN — Medication 40 MILLIGRAM(S): at 06:16

## 2018-06-07 RX ADMIN — MEMANTINE HYDROCHLORIDE 10 MILLIGRAM(S): 10 TABLET ORAL at 17:38

## 2018-06-07 RX ADMIN — HEPARIN SODIUM 5000 UNIT(S): 5000 INJECTION INTRAVENOUS; SUBCUTANEOUS at 17:31

## 2018-06-07 RX ADMIN — SENNA PLUS 2 TABLET(S): 8.6 TABLET ORAL at 21:54

## 2018-06-07 RX ADMIN — Medication 100 MILLIGRAM(S): at 13:52

## 2018-06-07 RX ADMIN — Medication 3 MILLILITER(S): at 20:41

## 2018-06-07 RX ADMIN — Medication 100 MILLIGRAM(S): at 21:54

## 2018-06-07 RX ADMIN — Medication 50 MILLIGRAM(S): at 06:16

## 2018-06-07 RX ADMIN — Medication 3 MILLILITER(S): at 03:29

## 2018-06-07 RX ADMIN — DONEPEZIL HYDROCHLORIDE 5 MILLIGRAM(S): 10 TABLET, FILM COATED ORAL at 21:55

## 2018-06-07 RX ADMIN — Medication 500 MILLIGRAM(S): at 12:28

## 2018-06-07 RX ADMIN — Medication 3 MILLILITER(S): at 15:26

## 2018-06-07 RX ADMIN — TAMSULOSIN HYDROCHLORIDE 0.8 MILLIGRAM(S): 0.4 CAPSULE ORAL at 21:54

## 2018-06-07 RX ADMIN — ATORVASTATIN CALCIUM 80 MILLIGRAM(S): 80 TABLET, FILM COATED ORAL at 21:54

## 2018-06-07 RX ADMIN — LACTULOSE 10 GRAM(S): 10 SOLUTION ORAL at 06:17

## 2018-06-07 NOTE — PROGRESS NOTE ADULT - SUBJECTIVE AND OBJECTIVE BOX
is an 84y old  Male who presents with a chief complaint of inability to place early. He has recovered from a UTI. He has constipaton.     His delirium and agitation has improved significantly with seroquel. His constipation also resolved after laxatives were used. On admission to the hospital on 05/26, he had a UTI w/sepsis and severe urinary obstruction due to BPH. Our urology team placed a early at that time, it had to be done carefully with a Cudet.  from urology would like the early removed a trial of voiding to happen.     Summary:   REVIEW OF SYSTEMS    General:	"I'm doing okay."    Skin/Breast:  	  Ophthalmologic:  	  ENMT:	    Respiratory and Thorax:  	  Cardiovascular:	    Gastrointestinal:	    Genitourinary:	    Musculoskeletal:	    Neurological:	    Psychiatric:	    Hematology/Lymphatics:	    Endocrine:	    Allergic/Immunologic:	  Vital Signs Last 24 Hrs  Vital Signs Last 24 Hrs  T(C): 36.9 (07 Jun 2018 07:55), Max: 37.1 (06 Jun 2018 23:48)  T(F): 98.4 (07 Jun 2018 07:55), Max: 98.7 (06 Jun 2018 23:48)  HR: 68 (07 Jun 2018 08:17) (68 - 81)  BP: 133/62 (07 Jun 2018 07:55) (133/62 - 140/78)  BP(mean): --  RR: 19 (07 Jun 2018 07:55) (18 - 19)  SpO2: 97% (07 Jun 2018 08:17) (91% - 98%)  PHYSICAL EXAM:  GEN: NAD, comfortable, awake, alert, speaking full sentences, calm  HEENT: dry MM  CVS: S1S2 RRR  PULM: good breath sounds  ABD: soft, slightly distended, no tenderness, no peritoneal signs  EXTREM: no edema  NEURO: intact  PSYCH: mentating  SKIN: warm, pink                                       MEDICATIONS  (STANDING):  ALBUTerol/ipratropium for Nebulization 3 milliLiter(s) Nebulizer every 6 hours  amLODIPine   Tablet 2.5 milliGRAM(s) Oral daily  ascorbic acid 500 milliGRAM(s) Oral daily  atorvastatin 80 milliGRAM(s) Oral at bedtime  docusate sodium 100 milliGRAM(s) Oral three times a day  donepezil 5 milliGRAM(s) Oral at bedtime  furosemide    Tablet 40 milliGRAM(s) Oral daily  heparin  Injectable 5000 Unit(s) SubCutaneous every 12 hours  iron sucrose IVPB 200 milliGRAM(s) IV Intermittent every 24 hours  lactulose Syrup 10 Gram(s) Oral four times a day  memantine 10 milliGRAM(s) Oral daily  metoprolol succinate ER 50 milliGRAM(s) Oral daily  multivitamin 1 Tablet(s) Oral daily  pantoprazole    Tablet 40 milliGRAM(s) Oral two times a day  polyethylene glycol 3350 17 Gram(s) Oral daily  QUEtiapine 150 milliGRAM(s) Oral at bedtime  QUEtiapine 50 milliGRAM(s) Oral daily  senna 2 Tablet(s) Oral at bedtime  tamsulosin 0.8 milliGRAM(s) Oral at bedtime    MEDICATIONS  (PRN):  bisacodyl Suppository 10 milliGRAM(s) Rectal daily PRN Constipation  ondansetron Injectable 4 milliGRAM(s) IV Push every 6 hours PRN Nausea and/or Vomiting

## 2018-06-07 NOTE — PROGRESS NOTE ADULT - SUBJECTIVE AND OBJECTIVE BOX
ABBY VIDAL  [id number]  84y, Male    Sepsis      Patient is a 84y old  Male who presents with a chief complaint of     HPI:  History from wife and hospitalist. No hematuria. just able to take a few steps at a time. no abdominal pain. no fevers or chills, no constipation    PSH: Hip replacement, Knee surgery (26 May 2018 09:28)      Allergies    No Known Allergies    Intolerances        MEDICATIONS  (STANDING):  ALBUTerol/ipratropium for Nebulization 3 milliLiter(s) Nebulizer every 6 hours  amLODIPine   Tablet 2.5 milliGRAM(s) Oral daily  ascorbic acid 500 milliGRAM(s) Oral daily  atorvastatin 80 milliGRAM(s) Oral at bedtime  docusate sodium 100 milliGRAM(s) Oral three times a day  donepezil 5 milliGRAM(s) Oral at bedtime  furosemide    Tablet 40 milliGRAM(s) Oral daily  heparin  Injectable 5000 Unit(s) SubCutaneous every 12 hours  iron sucrose IVPB 200 milliGRAM(s) IV Intermittent every 24 hours  lactulose Syrup 10 Gram(s) Oral four times a day  memantine 10 milliGRAM(s) Oral daily  metoprolol succinate ER 50 milliGRAM(s) Oral daily  multivitamin 1 Tablet(s) Oral daily  pantoprazole    Tablet 40 milliGRAM(s) Oral two times a day  polyethylene glycol 3350 17 Gram(s) Oral daily  QUEtiapine 150 milliGRAM(s) Oral at bedtime  QUEtiapine 50 milliGRAM(s) Oral daily  senna 2 Tablet(s) Oral at bedtime  tamsulosin 0.8 milliGRAM(s) Oral at bedtime    MEDICATIONS  (PRN):  bisacodyl Suppository 10 milliGRAM(s) Rectal daily PRN Constipation  ondansetron Injectable 4 milliGRAM(s) IV Push every 6 hours PRN Nausea and/or Vomiting      PAST MEDICAL & SURGICAL HISTORY:  Asthma  Anxiety  MI (myocardial infarction)  High cholesterol  Prostate CA  HTN (hypertension)  Dementia      I&O's Detail    06 Jun 2018 07:01  -  07 Jun 2018 07:00  --------------------------------------------------------  IN:  Total IN: 0 mL    OUT:    Ureteral Catheter: 1000 mL  Total OUT: 1000 mL    Total NET: -1000 mL      07 Jun 2018 07:01  -  07 Jun 2018 16:52  --------------------------------------------------------  IN:  Total IN: 0 mL    OUT:    Ureteral Catheter: 800 mL  Total OUT: 800 mL    Total NET: -800 mL          PE:    Vital Signs Last 24 Hrs  T(C): 36.9 (07 Jun 2018 16:27), Max: 37.1 (06 Jun 2018 23:48)  T(F): 98.5 (07 Jun 2018 16:27), Max: 98.7 (06 Jun 2018 23:48)  HR: 82 (07 Jun 2018 16:27) (66 - 82)  BP: 117/62 (07 Jun 2018 16:27) (117/62 - 140/78)  BP(mean): --  RR: 18 (07 Jun 2018 16:27) (18 - 19)  SpO2: 93% (07 Jun 2018 16:27) (91% - 98%)    Daily     Daily     PHYSICAL EXAM:      Constitutional: sleeping    Respiratory: no respiratory distress    Cardiovascular: good color    Gastrointestinal: no abdominal distention    Genitourinary: urine yellow    Skin: no jaundice        Impression:    Urinary retention  given his relatively poor mobility by wife's history I would leave early in place and let them remove it in rehab when his activity level is better.    Plan:  as above.  follow up with me as an outpatient in 3-4 weeks.     Jerrell Alonzo MD  06-07-18 @ 16:52

## 2018-06-07 NOTE — PROGRESS NOTE ADULT - ASSESSMENT
Patient is an 83 y/o M with a prior admission to [facility] for a hip fracture and subsequently transferred to a rehabilitation facility was found to have fever, altered mental status, and urine retention. He was transferred to [facility] and a urinary catheter was attempted unsuccessfully. The patient was then transferred to [facility] for further management. The patient was seen by Urology and a urinary catheter was inserted with the initiation of continuous bladder irrigation for hematuria. Intravenous antibiotics were initiated for sepsis and urinary tract infection. Lower extremity Doppler was without DVT. The patient was noted to be disoriented and pulling at devices requiring constant observation. The patient’s family reported that he was previously been treated with quetiapine but was later started on divalproex at the rehabilitation facility and quetiapine was restarted. CT of the head noted moderate cerebral volume loss, no gross intracranial hemorrhage, intracranial mass, midline shift, or acute stroke. The patient had improvement in the lethargy but continued to be disoriented. Repeat laboratory studies noted improvement in the kidney function and furosemide was restarted. The patient’s wife was met by Palliative Care to discuss the patient’s decline in functional status since his prior hip surgery one year prior. The patient completed the course of antibiotics.    1)UTI initially presenting with sepsis which is now resolved. Presented with toxic metabolic encephalopathy with underlying dementia   -s/p zosyn tx   -leucocytosis noted, no fevers, will monitor labs in am  -urine culture negative   -d/c cbi     Presenting with urinary retention - seen by Urology in ER, early placed - continue with early for 1 week until f/up   -c/w flomax in the interim     2) ALBERTO likely due to some degree of ATN with low flow state   -cr now normalized. will monitor     3) Dementia with decline   -seen by palliative care   -ct head noted   -c/w quetiapine, donepezil and memantine   -currently on 1:1 in the interim to ensure that he does not interfere with medical care (aka early)    4) Htn - well controlled with cad   -c/w metoprolol   -c/w statin     5) Hypoxemia with interval development of heart failure per xray imaging along with LE edema   -lasix was stopped on prior admission   -resume lasix and titrate down from O2   -duplex negative   -patient does not have a hx of heart failure documented. Given his decline, would tx symptomatically and avoid further testing that is not likely to change overall management     6) Severe protein calorie malnutrition - Nutrition evaluation noted. Encouraged oral intake.    7) DVT ppx: SQH started for now. d/c CBI and maintain early     9)Dispo: Dnr/i. prognosis guarded

## 2018-06-08 VITALS
SYSTOLIC BLOOD PRESSURE: 124 MMHG | HEART RATE: 65 BPM | DIASTOLIC BLOOD PRESSURE: 60 MMHG | OXYGEN SATURATION: 99 % | RESPIRATION RATE: 18 BRPM | TEMPERATURE: 98 F

## 2018-06-08 LAB
ANION GAP SERPL CALC-SCNC: 10 MMOL/L — SIGNIFICANT CHANGE UP (ref 5–17)
BUN SERPL-MCNC: 9 MG/DL — SIGNIFICANT CHANGE UP (ref 8–20)
CALCIUM SERPL-MCNC: 8.3 MG/DL — LOW (ref 8.6–10.2)
CHLORIDE SERPL-SCNC: 99 MMOL/L — SIGNIFICANT CHANGE UP (ref 98–107)
CO2 SERPL-SCNC: 32 MMOL/L — HIGH (ref 22–29)
CREAT SERPL-MCNC: 0.65 MG/DL — SIGNIFICANT CHANGE UP (ref 0.5–1.3)
GLUCOSE BLDC GLUCOMTR-MCNC: 155 MG/DL — HIGH (ref 70–99)
GLUCOSE SERPL-MCNC: 111 MG/DL — SIGNIFICANT CHANGE UP (ref 70–115)
MAGNESIUM SERPL-MCNC: 1.9 MG/DL — SIGNIFICANT CHANGE UP (ref 1.6–2.6)
PHOSPHATE SERPL-MCNC: 3.4 MG/DL — SIGNIFICANT CHANGE UP (ref 2.4–4.7)
POTASSIUM SERPL-MCNC: 3.1 MMOL/L — LOW (ref 3.5–5.3)
POTASSIUM SERPL-SCNC: 3.1 MMOL/L — LOW (ref 3.5–5.3)
SODIUM SERPL-SCNC: 141 MMOL/L — SIGNIFICANT CHANGE UP (ref 135–145)

## 2018-06-08 PROCEDURE — 82803 BLOOD GASES ANY COMBINATION: CPT

## 2018-06-08 PROCEDURE — 84484 ASSAY OF TROPONIN QUANT: CPT

## 2018-06-08 PROCEDURE — 82553 CREATINE MB FRACTION: CPT

## 2018-06-08 PROCEDURE — 83605 ASSAY OF LACTIC ACID: CPT

## 2018-06-08 PROCEDURE — 83880 ASSAY OF NATRIURETIC PEPTIDE: CPT

## 2018-06-08 PROCEDURE — 86850 RBC ANTIBODY SCREEN: CPT

## 2018-06-08 PROCEDURE — 83735 ASSAY OF MAGNESIUM: CPT

## 2018-06-08 PROCEDURE — 99285 EMERGENCY DEPT VISIT HI MDM: CPT | Mod: 25

## 2018-06-08 PROCEDURE — 82550 ASSAY OF CK (CPK): CPT

## 2018-06-08 PROCEDURE — 82962 GLUCOSE BLOOD TEST: CPT

## 2018-06-08 PROCEDURE — 84466 ASSAY OF TRANSFERRIN: CPT

## 2018-06-08 PROCEDURE — 85014 HEMATOCRIT: CPT

## 2018-06-08 PROCEDURE — 36600 WITHDRAWAL OF ARTERIAL BLOOD: CPT

## 2018-06-08 PROCEDURE — 80164 ASSAY DIPROPYLACETIC ACD TOT: CPT

## 2018-06-08 PROCEDURE — 85027 COMPLETE CBC AUTOMATED: CPT

## 2018-06-08 PROCEDURE — 80048 BASIC METABOLIC PNL TOTAL CA: CPT

## 2018-06-08 PROCEDURE — 86900 BLOOD TYPING SEROLOGIC ABO: CPT

## 2018-06-08 PROCEDURE — 94640 AIRWAY INHALATION TREATMENT: CPT

## 2018-06-08 PROCEDURE — 85018 HEMOGLOBIN: CPT

## 2018-06-08 PROCEDURE — 86901 BLOOD TYPING SEROLOGIC RH(D): CPT

## 2018-06-08 PROCEDURE — 51702 INSERT TEMP BLADDER CATH: CPT

## 2018-06-08 PROCEDURE — 81001 URINALYSIS AUTO W/SCOPE: CPT

## 2018-06-08 PROCEDURE — 71045 X-RAY EXAM CHEST 1 VIEW: CPT

## 2018-06-08 PROCEDURE — 82728 ASSAY OF FERRITIN: CPT

## 2018-06-08 PROCEDURE — 84100 ASSAY OF PHOSPHORUS: CPT

## 2018-06-08 PROCEDURE — 87086 URINE CULTURE/COLONY COUNT: CPT

## 2018-06-08 PROCEDURE — 80053 COMPREHEN METABOLIC PANEL: CPT

## 2018-06-08 PROCEDURE — 82746 ASSAY OF FOLIC ACID SERUM: CPT

## 2018-06-08 PROCEDURE — 36415 COLL VENOUS BLD VENIPUNCTURE: CPT

## 2018-06-08 PROCEDURE — 70450 CT HEAD/BRAIN W/O DYE: CPT

## 2018-06-08 PROCEDURE — 99239 HOSP IP/OBS DSCHRG MGMT >30: CPT

## 2018-06-08 PROCEDURE — 85730 THROMBOPLASTIN TIME PARTIAL: CPT

## 2018-06-08 PROCEDURE — 85610 PROTHROMBIN TIME: CPT

## 2018-06-08 PROCEDURE — 93970 EXTREMITY STUDY: CPT

## 2018-06-08 PROCEDURE — 82607 VITAMIN B-12: CPT

## 2018-06-08 PROCEDURE — 93005 ELECTROCARDIOGRAM TRACING: CPT

## 2018-06-08 PROCEDURE — 83690 ASSAY OF LIPASE: CPT

## 2018-06-08 PROCEDURE — 83550 IRON BINDING TEST: CPT

## 2018-06-08 RX ORDER — ACETAMINOPHEN 500 MG
3 TABLET ORAL
Qty: 0 | Refills: 0 | COMMUNITY

## 2018-06-08 RX ORDER — DEXTROSE 50 % IN WATER 50 %
25 SYRINGE (ML) INTRAVENOUS ONCE
Qty: 0 | Refills: 0 | Status: DISCONTINUED | OUTPATIENT
Start: 2018-06-08 | End: 2018-06-08

## 2018-06-08 RX ORDER — INSULIN GLARGINE 100 [IU]/ML
30 INJECTION, SOLUTION SUBCUTANEOUS AT BEDTIME
Qty: 0 | Refills: 0 | Status: DISCONTINUED | OUTPATIENT
Start: 2018-06-08 | End: 2018-06-08

## 2018-06-08 RX ORDER — ACETAMINOPHEN 500 MG
2 TABLET ORAL
Qty: 0 | Refills: 0 | COMMUNITY

## 2018-06-08 RX ORDER — POTASSIUM CHLORIDE 20 MEQ
40 PACKET (EA) ORAL EVERY 4 HOURS
Qty: 0 | Refills: 0 | Status: COMPLETED | OUTPATIENT
Start: 2018-06-08 | End: 2018-06-08

## 2018-06-08 RX ORDER — INSULIN LISPRO 100/ML
VIAL (ML) SUBCUTANEOUS
Qty: 0 | Refills: 0 | Status: DISCONTINUED | OUTPATIENT
Start: 2018-06-08 | End: 2018-06-08

## 2018-06-08 RX ORDER — SODIUM CHLORIDE 9 MG/ML
1000 INJECTION, SOLUTION INTRAVENOUS
Qty: 0 | Refills: 0 | Status: DISCONTINUED | OUTPATIENT
Start: 2018-06-08 | End: 2018-06-08

## 2018-06-08 RX ORDER — DEXTROSE 50 % IN WATER 50 %
15 SYRINGE (ML) INTRAVENOUS ONCE
Qty: 0 | Refills: 0 | Status: DISCONTINUED | OUTPATIENT
Start: 2018-06-08 | End: 2018-06-08

## 2018-06-08 RX ORDER — GLUCAGON INJECTION, SOLUTION 0.5 MG/.1ML
1 INJECTION, SOLUTION SUBCUTANEOUS ONCE
Qty: 0 | Refills: 0 | Status: DISCONTINUED | OUTPATIENT
Start: 2018-06-08 | End: 2018-06-08

## 2018-06-08 RX ORDER — DEXTROSE 50 % IN WATER 50 %
12.5 SYRINGE (ML) INTRAVENOUS ONCE
Qty: 0 | Refills: 0 | Status: DISCONTINUED | OUTPATIENT
Start: 2018-06-08 | End: 2018-06-08

## 2018-06-08 RX ADMIN — HEPARIN SODIUM 5000 UNIT(S): 5000 INJECTION INTRAVENOUS; SUBCUTANEOUS at 05:53

## 2018-06-08 RX ADMIN — Medication 40 MILLIGRAM(S): at 05:53

## 2018-06-08 RX ADMIN — Medication 500 MILLIGRAM(S): at 11:38

## 2018-06-08 RX ADMIN — Medication 40 MILLIEQUIVALENT(S): at 11:40

## 2018-06-08 RX ADMIN — MEMANTINE HYDROCHLORIDE 10 MILLIGRAM(S): 10 TABLET ORAL at 11:38

## 2018-06-08 RX ADMIN — PANTOPRAZOLE SODIUM 40 MILLIGRAM(S): 20 TABLET, DELAYED RELEASE ORAL at 05:53

## 2018-06-08 RX ADMIN — Medication 3 MILLILITER(S): at 15:39

## 2018-06-08 RX ADMIN — Medication 40 MILLIEQUIVALENT(S): at 16:13

## 2018-06-08 RX ADMIN — Medication 1 TABLET(S): at 11:38

## 2018-06-08 RX ADMIN — POLYETHYLENE GLYCOL 3350 17 GRAM(S): 17 POWDER, FOR SOLUTION ORAL at 11:40

## 2018-06-08 RX ADMIN — Medication 50 MILLIGRAM(S): at 05:53

## 2018-06-08 RX ADMIN — Medication 100 MILLIGRAM(S): at 13:48

## 2018-06-08 RX ADMIN — Medication 3 MILLILITER(S): at 08:25

## 2018-06-08 RX ADMIN — AMLODIPINE BESYLATE 2.5 MILLIGRAM(S): 2.5 TABLET ORAL at 05:53

## 2018-06-08 RX ADMIN — Medication 100 MILLIGRAM(S): at 05:53

## 2018-06-08 RX ADMIN — QUETIAPINE FUMARATE 50 MILLIGRAM(S): 200 TABLET, FILM COATED ORAL at 11:38

## 2018-06-08 NOTE — PROGRESS NOTE ADULT - PROBLEM SELECTOR PLAN 3
Monitor for pain.
-resolved, can d/c on laxatives prn
-cont senna/colace/miralax
-cont senna/colace/miralax  -cont lactulose QID now  -cont bisacodyl suppository
-cont senna/colace/miralax  -starting lactulose QID now
-cont senna/colace/miralax  -starting lactulose QID now
-resolved, can d/c on laxatives prn
-resolved, can d/c on laxatives prn

## 2018-06-08 NOTE — PROGRESS NOTE ADULT - PROBLEM SELECTOR PROBLEM 4
Agitation
Benign prostatic hyperplasia with urinary hesitancy
Benign prostatic hyperplasia with urinary hesitancy

## 2018-06-08 NOTE — PROGRESS NOTE ADULT - SUBJECTIVE AND OBJECTIVE BOX
is an 84y old  Male who presents with a chief complaint of inability to place early. He has recovered from a UTI. He has constipaton.     His delirium and agitation has improved significantly with seroquel. His constipation also resolved after laxatives were used. On admission to the hospital on 05/26, he had a UTI w/sepsis and severe urinary obstruction due to BPH. Our urology team placed a early at that time, it had to be done carefully with a Cudet.    He is going to be d/cd with the early to rehab.    Summary:   REVIEW OF SYSTEMS    General:	"I'm doing okay."    Skin/Breast:  	  Ophthalmologic:  	  ENMT:	    Respiratory and Thorax:  	  Cardiovascular:	    Gastrointestinal:	    Genitourinary:	    Musculoskeletal:	    Neurological:	    Psychiatric:	    Hematology/Lymphatics:	    Endocrine:	    Allergic/Immunologic:	    PHYSICAL EXAM:  Vital Signs Last 24 Hrs  T(C): 36.5 (08 Jun 2018 09:23), Max: 36.9 (07 Jun 2018 16:27)  T(F): 97.7 (08 Jun 2018 09:23), Max: 98.5 (07 Jun 2018 16:27)  HR: 65 (08 Jun 2018 09:23) (63 - 82)  BP: 143/77 (08 Jun 2018 09:23) (113/65 - 148/73)  BP(mean): --  RR: 18 (08 Jun 2018 09:23) (18 - 18)  SpO2: 97% (08 Jun 2018 09:23) (93% - 97%)  GEN: NAD, comfortable, awake, alert, speaking full sentences, calm  HEENT: dry MM  NECK: supple  CVS: S1S2 RRR  PULM: good breath sounds  ABD: soft, slightly distended, no tenderness, no peritoneal signs  EXTREM: no edema  NEURO: intact  PSYCH: mentating  SKIN: warm, pink                                       06-08    141  |  99  |  9.0  ----------------------------<  111  3.1<L>   |  32.0<H>  |  0.65    Ca    8.3<L>      08 Jun 2018 09:02  Phos  3.4     06-08  Mg     1.9     06-08    Radiology:     MEDICATIONS  (STANDING):  ALBUTerol/ipratropium for Nebulization 3 milliLiter(s) Nebulizer every 6 hours  amLODIPine   Tablet 2.5 milliGRAM(s) Oral daily  ascorbic acid 500 milliGRAM(s) Oral daily  atorvastatin 80 milliGRAM(s) Oral at bedtime  dextrose 5%. 1000 milliLiter(s) (50 mL/Hr) IV Continuous <Continuous>  dextrose 50% Injectable 12.5 Gram(s) IV Push once  dextrose 50% Injectable 25 Gram(s) IV Push once  dextrose 50% Injectable 25 Gram(s) IV Push once  docusate sodium 100 milliGRAM(s) Oral three times a day  donepezil 5 milliGRAM(s) Oral at bedtime  furosemide    Tablet 40 milliGRAM(s) Oral daily  heparin  Injectable 5000 Unit(s) SubCutaneous every 12 hours  insulin glargine Injectable (LANTUS) 30 Unit(s) SubCutaneous at bedtime  insulin lispro (HumaLOG) corrective regimen sliding scale   SubCutaneous three times a day before meals  lactulose Syrup 10 Gram(s) Oral four times a day  memantine 10 milliGRAM(s) Oral daily  metoprolol succinate ER 50 milliGRAM(s) Oral daily  multivitamin 1 Tablet(s) Oral daily  pantoprazole    Tablet 40 milliGRAM(s) Oral two times a day  polyethylene glycol 3350 17 Gram(s) Oral daily  potassium chloride    Tablet ER 40 milliEquivalent(s) Oral every 4 hours  QUEtiapine 150 milliGRAM(s) Oral at bedtime  QUEtiapine 50 milliGRAM(s) Oral daily  senna 2 Tablet(s) Oral at bedtime  tamsulosin 0.8 milliGRAM(s) Oral at bedtime    MEDICATIONS  (PRN):  bisacodyl Suppository 10 milliGRAM(s) Rectal daily PRN Constipation  dextrose 40% Gel 15 Gram(s) Oral once PRN Blood Glucose LESS THAN 70 milliGRAM(s)/deciliter  glucagon  Injectable 1 milliGRAM(s) IntraMuscular once PRN Glucose LESS THAN 70 milligrams/deciliter  ondansetron Injectable 4 milliGRAM(s) IV Push every 6 hours PRN Nausea and/or Vomiting

## 2018-06-08 NOTE — PROGRESS NOTE ADULT - PROBLEM SELECTOR PLAN 2
Maintaining  early
-resolved  -cont seroquel and d/c on po seroquel
-resolving, cont seroquel
-resolving, cont seroquel  -responding to increased doses of seroquel  -d/c 1:1
-resolving, cont seroquel  -responding to increased doses of seroquel  -d/c 1:1

## 2018-06-08 NOTE — PROGRESS NOTE ADULT - PROBLEM SELECTOR PROBLEM 1
Acute cystitis without hematuria
Dementia

## 2018-06-08 NOTE — PROGRESS NOTE ADULT - PROBLEM SELECTOR PLAN 4
on 1;1  Cont Seroquel   Consider Risperidone 0.25mg po prn if needed
-cont early  -cont tamsulosin/finasteride  -outpatient f/u with urologist    -d/c to [facility] with a early
-cont early  -cont tamsulosin/finasteride  -urologist  will come by and give the order for early removal and we'll do a trial of voiding thereafter (I appreciate urology support)

## 2018-06-08 NOTE — PROGRESS NOTE ADULT - PROBLEM SELECTOR PROBLEM 3
Hip fracture
Slow transit constipation

## 2018-06-08 NOTE — PROGRESS NOTE ADULT - ATTENDING COMMENTS
-d/c to rehab today
-d/c to rehab tomm
-d/c to rehab tomm after early removal and TOV
-d/c to rehab tomm after earyl removal and TOV

## 2018-06-08 NOTE — PROGRESS NOTE ADULT - PROVIDER SPECIALTY LIST ADULT
Hospitalist
Urology
Hospitalist
Hospitalist
Palliative Care
Hospitalist

## 2018-06-08 NOTE — PROGRESS NOTE ADULT - PROBLEM SELECTOR PLAN 1
-resolved
Assist with care  Wife states functional decline after first hip fracture 1 year ago

## 2018-06-09 ENCOUNTER — OUTPATIENT (OUTPATIENT)
Dept: OUTPATIENT SERVICES | Facility: HOSPITAL | Age: 83
LOS: 1 days | End: 2018-06-09

## 2018-06-11 ENCOUNTER — OUTPATIENT (OUTPATIENT)
Dept: OUTPATIENT SERVICES | Facility: HOSPITAL | Age: 83
LOS: 1 days | End: 2018-06-11

## 2018-06-18 ENCOUNTER — OUTPATIENT (OUTPATIENT)
Dept: OUTPATIENT SERVICES | Facility: HOSPITAL | Age: 83
LOS: 1 days | End: 2018-06-18

## 2018-06-20 PROBLEM — C61 MALIGNANT NEOPLASM OF PROSTATE: Chronic | Status: ACTIVE | Noted: 2018-05-26

## 2018-06-20 PROBLEM — I10 ESSENTIAL (PRIMARY) HYPERTENSION: Chronic | Status: ACTIVE | Noted: 2018-05-26

## 2018-06-20 PROBLEM — F41.9 ANXIETY DISORDER, UNSPECIFIED: Chronic | Status: ACTIVE | Noted: 2018-05-26

## 2018-06-20 PROBLEM — E78.00 PURE HYPERCHOLESTEROLEMIA, UNSPECIFIED: Chronic | Status: ACTIVE | Noted: 2018-05-26

## 2018-06-20 PROBLEM — J45.909 UNSPECIFIED ASTHMA, UNCOMPLICATED: Chronic | Status: ACTIVE | Noted: 2018-05-26

## 2018-06-20 PROBLEM — I21.9 ACUTE MYOCARDIAL INFARCTION, UNSPECIFIED: Chronic | Status: ACTIVE | Noted: 2018-05-26

## 2018-06-20 PROBLEM — F03.90 UNSPECIFIED DEMENTIA WITHOUT BEHAVIORAL DISTURBANCE: Chronic | Status: ACTIVE | Noted: 2018-05-26

## 2018-06-24 ENCOUNTER — OUTPATIENT (OUTPATIENT)
Dept: OUTPATIENT SERVICES | Facility: HOSPITAL | Age: 83
LOS: 1 days | End: 2018-06-24

## 2018-06-25 ENCOUNTER — OUTPATIENT (OUTPATIENT)
Dept: OUTPATIENT SERVICES | Facility: HOSPITAL | Age: 83
LOS: 1 days | End: 2018-06-25

## 2018-07-02 ENCOUNTER — APPOINTMENT (OUTPATIENT)
Dept: UROLOGY | Facility: CLINIC | Age: 83
End: 2018-07-02
Payer: MEDICARE

## 2018-07-02 ENCOUNTER — OUTPATIENT (OUTPATIENT)
Dept: OUTPATIENT SERVICES | Facility: HOSPITAL | Age: 83
LOS: 1 days | End: 2018-07-02

## 2018-07-02 VITALS
SYSTOLIC BLOOD PRESSURE: 169 MMHG | WEIGHT: 193 LBS | DIASTOLIC BLOOD PRESSURE: 69 MMHG | TEMPERATURE: 98.6 F | HEIGHT: 72 IN | BODY MASS INDEX: 26.14 KG/M2 | HEART RATE: 69 BPM

## 2018-07-02 DIAGNOSIS — Z82.3 FAMILY HISTORY OF STROKE: ICD-10-CM

## 2018-07-02 DIAGNOSIS — Z82.49 FAMILY HISTORY OF ISCHEMIC HEART DISEASE AND OTHER DISEASES OF THE CIRCULATORY SYSTEM: ICD-10-CM

## 2018-07-02 DIAGNOSIS — Z87.891 PERSONAL HISTORY OF NICOTINE DEPENDENCE: ICD-10-CM

## 2018-07-02 DIAGNOSIS — F02.80 DEMENTIA WITH LEWY BODIES: ICD-10-CM

## 2018-07-02 DIAGNOSIS — I51.9 HEART DISEASE, UNSPECIFIED: ICD-10-CM

## 2018-07-02 DIAGNOSIS — G31.83 DEMENTIA WITH LEWY BODIES: ICD-10-CM

## 2018-07-02 DIAGNOSIS — K21.9 GASTRO-ESOPHAGEAL REFLUX DISEASE W/OUT ESOPHAGITIS: ICD-10-CM

## 2018-07-02 DIAGNOSIS — Z80.1 FAMILY HISTORY OF MALIGNANT NEOPLASM OF TRACHEA, BRONCHUS AND LUNG: ICD-10-CM

## 2018-07-02 DIAGNOSIS — E78.00 PURE HYPERCHOLESTEROLEMIA, UNSPECIFIED: ICD-10-CM

## 2018-07-02 PROCEDURE — 51700 IRRIGATION OF BLADDER: CPT

## 2018-07-02 RX ORDER — TAMSULOSIN HYDROCHLORIDE 0.4 MG/1
0.4 CAPSULE ORAL
Qty: 60 | Refills: 0 | Status: ACTIVE | COMMUNITY
Start: 2018-07-02

## 2018-07-02 RX ORDER — ATORVASTATIN CALCIUM 80 MG/1
80 TABLET, FILM COATED ORAL DAILY
Qty: 30 | Refills: 0 | Status: ACTIVE | COMMUNITY
Start: 2018-07-02

## 2018-07-02 RX ORDER — OMEPRAZOLE 20 MG/1
20 CAPSULE, DELAYED RELEASE ORAL DAILY
Qty: 60 | Refills: 0 | Status: ACTIVE | COMMUNITY
Start: 2018-07-02

## 2018-07-02 RX ORDER — MULTIVIT-MIN/FOLIC/VIT K/LYCOP 400-300MCG
50 MCG TABLET ORAL
Qty: 90 | Refills: 1 | Status: ACTIVE | COMMUNITY
Start: 2018-07-02

## 2018-07-02 RX ORDER — MEMANTINE HYDROCHLORIDE 10 MG/1
10 TABLET, FILM COATED ORAL TWICE DAILY
Qty: 60 | Refills: 0 | Status: ACTIVE | COMMUNITY
Start: 2018-07-02

## 2018-07-02 RX ORDER — QUETIAPINE FUMARATE 200 MG/1
200 TABLET ORAL
Qty: 30 | Refills: 0 | Status: ACTIVE | COMMUNITY
Start: 2018-07-02

## 2018-07-02 RX ORDER — DONEPEZIL HYDROCHLORIDE 5 MG/1
5 TABLET ORAL
Qty: 30 | Refills: 0 | Status: ACTIVE | COMMUNITY
Start: 2018-07-02

## 2018-07-02 RX ORDER — METOPROLOL SUCCINATE 50 MG/1
50 TABLET, EXTENDED RELEASE ORAL DAILY
Qty: 30 | Refills: 0 | Status: ACTIVE | COMMUNITY
Start: 2018-07-02

## 2018-07-02 RX ORDER — DOCUSATE SODIUM 100 MG/1
100 CAPSULE ORAL
Qty: 60 | Refills: 0 | Status: ACTIVE | COMMUNITY
Start: 2018-07-02

## 2018-07-02 RX ORDER — DIVALPROEX SODIUM 500 1/1
500 TABLET, EXTENDED RELEASE ORAL
Qty: 30 | Refills: 0 | Status: ACTIVE | COMMUNITY
Start: 2018-07-02

## 2018-07-06 ENCOUNTER — OUTPATIENT (OUTPATIENT)
Dept: OUTPATIENT SERVICES | Facility: HOSPITAL | Age: 83
LOS: 1 days | End: 2018-07-06

## 2018-07-09 ENCOUNTER — OUTPATIENT (OUTPATIENT)
Dept: OUTPATIENT SERVICES | Facility: HOSPITAL | Age: 83
LOS: 1 days | End: 2018-07-09

## 2018-07-14 ENCOUNTER — OUTPATIENT (OUTPATIENT)
Dept: OUTPATIENT SERVICES | Facility: HOSPITAL | Age: 83
LOS: 1 days | End: 2018-07-14

## 2018-07-16 ENCOUNTER — OUTPATIENT (OUTPATIENT)
Dept: OUTPATIENT SERVICES | Facility: HOSPITAL | Age: 83
LOS: 1 days | End: 2018-07-16

## 2018-07-19 ENCOUNTER — OUTPATIENT (OUTPATIENT)
Dept: OUTPATIENT SERVICES | Facility: HOSPITAL | Age: 83
LOS: 1 days | End: 2018-07-19

## 2018-07-19 ENCOUNTER — INPATIENT (INPATIENT)
Facility: HOSPITAL | Age: 83
LOS: 2 days | Discharge: HOME | End: 2018-07-22
Payer: MEDICARE

## 2018-07-19 PROCEDURE — 99291 CRITICAL CARE FIRST HOUR: CPT

## 2018-07-19 PROCEDURE — 99223 1ST HOSP IP/OBS HIGH 75: CPT

## 2018-07-19 PROCEDURE — 93306 TTE W/DOPPLER COMPLETE: CPT | Mod: 26

## 2018-07-19 PROCEDURE — 71045 X-RAY EXAM CHEST 1 VIEW: CPT | Mod: 26

## 2018-07-20 ENCOUNTER — OUTPATIENT (OUTPATIENT)
Dept: OUTPATIENT SERVICES | Facility: HOSPITAL | Age: 83
LOS: 1 days | End: 2018-07-20

## 2018-07-20 PROCEDURE — 99232 SBSQ HOSP IP/OBS MODERATE 35: CPT

## 2018-07-21 ENCOUNTER — OUTPATIENT (OUTPATIENT)
Dept: OUTPATIENT SERVICES | Facility: HOSPITAL | Age: 83
LOS: 1 days | End: 2018-07-21

## 2018-07-21 PROCEDURE — 99233 SBSQ HOSP IP/OBS HIGH 50: CPT

## 2018-07-22 ENCOUNTER — OUTPATIENT (OUTPATIENT)
Dept: OUTPATIENT SERVICES | Facility: HOSPITAL | Age: 83
LOS: 1 days | End: 2018-07-22

## 2018-07-22 PROCEDURE — 71045 X-RAY EXAM CHEST 1 VIEW: CPT | Mod: 26

## 2018-07-22 PROCEDURE — 99233 SBSQ HOSP IP/OBS HIGH 50: CPT

## 2018-07-23 ENCOUNTER — APPOINTMENT (OUTPATIENT)
Dept: UROLOGY | Facility: CLINIC | Age: 83
End: 2018-07-23

## 2018-07-23 ENCOUNTER — OUTPATIENT (OUTPATIENT)
Dept: OUTPATIENT SERVICES | Facility: HOSPITAL | Age: 83
LOS: 1 days | End: 2018-07-23

## 2018-07-30 ENCOUNTER — APPOINTMENT (OUTPATIENT)
Dept: UROLOGY | Facility: CLINIC | Age: 83
End: 2018-07-30
Payer: MEDICARE

## 2018-07-30 PROCEDURE — 99213 OFFICE O/P EST LOW 20 MIN: CPT | Mod: 25

## 2018-07-30 PROCEDURE — 51700 IRRIGATION OF BLADDER: CPT

## 2018-07-30 NOTE — PHYSICAL EXAM
[General Appearance - Well Developed] : well developed [General Appearance - Well Nourished] : well nourished [Normal Appearance] : normal appearance [Well Groomed] : well groomed [General Appearance - In No Acute Distress] : no acute distress [Abdomen Soft] : soft [Abdomen Tenderness] : non-tender [Costovertebral Angle Tenderness] : no ~M costovertebral angle tenderness [Skin Color & Pigmentation] : normal skin color and pigmentation [FreeTextEntry1] : some mild peripheral edema [] : no respiratory distress [Respiration, Rhythm And Depth] : normal respiratory rhythm and effort [Exaggerated Use Of Accessory Muscles For Inspiration] : no accessory muscle use [Mood] : the mood was normal [Not Anxious] : not anxious

## 2018-07-30 NOTE — HISTORY OF PRESENT ILLNESS
[FreeTextEntry1] : patient  tolerating increasing dose of medicine.no diarrhea or constipation. He needs a bit of assist but not too much.

## 2018-07-30 NOTE — ASSESSMENT
[FreeTextEntry1] : Impression:\par \par urinary retention\par failed voiding trial today, see nurse note.\par \par Plan"\par \par early replaced\par follow up one month for repeat voiding trial and possible early change in not able to void\par continue tamsulosin

## 2018-08-06 ENCOUNTER — OUTPATIENT (OUTPATIENT)
Dept: OUTPATIENT SERVICES | Facility: HOSPITAL | Age: 83
LOS: 1 days | End: 2018-08-06

## 2018-08-20 ENCOUNTER — OUTPATIENT (OUTPATIENT)
Dept: OUTPATIENT SERVICES | Facility: HOSPITAL | Age: 83
LOS: 1 days | End: 2018-08-20

## 2018-09-04 ENCOUNTER — APPOINTMENT (OUTPATIENT)
Dept: UROLOGY | Facility: CLINIC | Age: 83
End: 2018-09-04

## 2019-06-09 PROBLEM — I51.9 HEART DISEASE: Status: ACTIVE | Noted: 2018-07-02

## 2020-01-30 NOTE — PROCEDURE NOTE - NSSIZEINFR_GEN_A_CORE
Vaccine Information Statement    Influenza (Flu) Vaccine (Inactivated or Recombinant): What you need to know    Many Vaccine Information Statements are available in [place] and other languages. See www.immunize.org/vis  Hojas de Información Sobre Vacunas están disponibles en Español y en muchos otros idiomas. Visite www.immunize.org/vis    1. Why get vaccinated? Influenza (flu) is a contagious disease that spreads around the [place] every year, usually between October and May. Flu is caused by influenza viruses, and is spread mainly by coughing, sneezing, and close contact. Anyone can get flu. Flu strikes suddenly and can last several days. Symptoms vary by age, but can include:   fever/chills   sore throat   muscle aches   fatigue   cough   headache    runny or stuffy nose    Flu can also lead to pneumonia and blood infections, and cause diarrhea and seizures in children. If you have a medical condition, such as heart or lung disease, flu can make it worse. Flu is more dangerous for some people. Infants and young children, people 72years of age and older, pregnant women, and people with certain health conditions or a weakened immune system are at greatest risk. Each year thousands of people in the [facility] die from flu, and many more are hospitalized. Flu vaccine can:   keep you from getting flu,   make flu less severe if you do get it, and   keep you from spreading flu to your family and other people. 2. Inactivated and recombinant flu vaccines    A dose of flu vaccine is recommended every flu season. Children 6 months through 6years of age may need two doses during the same flu season. Everyone else needs only one dose each flu season.        Some inactivated flu vaccines contain a very small amount of a mercury-based preservative called thimerosal. Studies have not shown thimerosal in vaccines to be harmful, but flu vaccines that do not contain thimerosal are available. There is no live flu virus in flu shots. They cannot cause the flu. There are many flu viruses, and they are always changing. Each year a new flu vaccine is made to protect against three or four viruses that are likely to cause disease in the upcoming flu season. But even when the vaccine doesnt exactly match these viruses, it may still provide some protection    Flu vaccine cannot prevent:   flu that is caused by a virus not covered by the vaccine, or   illnesses that look like flu but are not. It takes about 2 weeks for protection to develop after vaccination, and protection lasts through the flu season. 3. Some people should not get this vaccine    Tell the person who is giving you the vaccine:     If you have any severe, life-threatening allergies. If you ever had a life-threatening allergic reaction after a dose of flu vaccine, or have a severe allergy to any part of this vaccine, you may be advised not to get vaccinated. Most, but not all, types of flu vaccine contain a small amount of egg protein.  If you ever had Guillain-Barré Syndrome (also called GBS). Some people with a history of GBS should not get this vaccine. This should be discussed with your doctor.  If you are not feeling well. It is usually okay to get flu vaccine when you have a mild illness, but you might be asked to come back when you feel better. 4. Risks of a vaccine reaction    With any medicine, including vaccines, there is a chance of reactions. These are usually mild and go away on their own, but serious reactions are also possible. Most people who get a flu shot do not have any problems with it.      Minor problems following a flu shot include:    soreness, redness, or swelling where the shot was given     hoarseness   sore, red or itchy eyes   cough   fever   aches   headache   itching   fatigue  If these problems occur, they usually begin soon after the shot and last 1 or 2 days. More serious problems following a flu shot can include the following:     There may be a small increased risk of Guillain-Barré Syndrome (GBS) after inactivated flu vaccine. This risk has been estimated at 1 or 2 additional cases per million people vaccinated. This is much lower than the risk of severe complications from flu, which can be prevented by flu vaccine.  Young children who get the flu shot along with pneumococcal vaccine (PCV13) and/or DTaP vaccine at the same time might be slightly more likely to have a seizure caused by fever. Ask your doctor for more information. Tell your doctor if a child who is getting flu vaccine has ever had a seizure. Problems that could happen after any injected vaccine:      People sometimes faint after a medical procedure, including vaccination. Sitting or lying down for about 15 minutes can help prevent fainting, and injuries caused by a fall. Tell your doctor if you feel dizzy, or have vision changes or ringing in the ears.  Some people get severe pain in the shoulder and have difficulty moving the arm where a shot was given. This happens very rarely.  Any medication can cause a severe allergic reaction. Such reactions from a vaccine are very rare, estimated at about 1 in a million doses, and would happen within a few minutes to a few hours after the vaccination. As with any medicine, there is a very remote chance of a vaccine causing a serious injury or death. The safety of vaccines is always being monitored. For more information, visit: www.cdc.gov/vaccinesafety/    5. What if there is a serious reaction? What should I look for?  Look for anything that concerns you, such as signs of a severe allergic reaction, very high fever, or unusual behavior.     Signs of a severe allergic reaction can include hives, swelling of the face and throat, difficulty breathing, a fast heartbeat, dizziness, and weakness - usually within a few minutes to a few hours after the vaccination. What should I do?  If you think it is a severe allergic reaction or other emergency that cant wait, call 9-1-1 and get the person to the nearest hospital. Otherwise, call your doctor.  Reactions should be reported to the Vaccine Adverse Event Reporting System (VAERS). Your doctor should file this report, or you can do it yourself through  the VAERS web site at www.vaers. [facility].gov, or by calling 4-510.636.3529. VAERS does not give medical advice. 6. The National Vaccine Injury Compensation Program    The [facility] Vaccine Injury Compensation Program (VICP) is a federal program that was created to compensate people who may have been injured by certain vaccines. Persons who believe they may have been injured by a vaccine can learn about the program and about filing a claim by calling 5-746.482.3186 or visiting the [employer] website at www.[facility].gov/vaccinecompensation. There is a time limit to file a claim for compensation. 7. How can I learn more?  Ask your healthcare provider. He or she can give you the vaccine package insert or suggest other sources of information.  Call your local or state health department.  Contact the Centers for Disease Control and Prevention (CDC):  - Call 4-133.869.1018 (1-800-CDC-INFO) or  - Visit CDCs website at www.cdc.gov/flu    Vaccine Information Statement   Inactivated Influenza Vaccine   8/7/2015  42 AMBAR Dolly Judith [id number]    Department of Health and Human Services  Centers for Disease Control and Prevention    Office Use Only 24/3-way attending Psychiatrist without NP/Trainee

## 2021-01-01 NOTE — DIETITIAN INITIAL EVALUATION ADULT. - MD RECOMMEND
po supplement/other/MVI daily For information on Fall & Injury Prevention, visit: [email or web address]-mobility OR  [email or web address]-injury OR  https://www.cdc.gov/steadi/patient.html

## 2021-06-07 NOTE — PROGRESS NOTE ADULT - ASSESSMENT
Addended by: JOON TURNER on: 6/7/2021 08:40 AM     Modules accepted: Orders     Patient is an 85 y/o M with a prior admission to [facility] for a hip fracture and subsequently transferred to a rehabilitation facility was found to have fever, altered mental status, and urine retention. He was transferred to [facility] and a urinary catheter was attempted unsuccessfully. The patient was then transferred to [facility] for further management. The patient was seen by Urology and a urinary catheter was inserted with the initiation of continuous bladder irrigation for hematuria. Intravenous antibiotics were initiated for sepsis and urinary tract infection. Lower extremity Doppler was without DVT. The patient was noted to be disoriented and pulling at devices requiring constant observation. The patient’s family reported that he was previously been treated with quetiapine but was later started on divalproex at the rehabilitation facility and quetiapine was restarted. CT of the head noted moderate cerebral volume loss, no gross intracranial hemorrhage, intracranial mass, midline shift, or acute stroke. The patient had improvement in the lethargy but continued to be disoriented. Repeat laboratory studies noted improvement in the kidney function and furosemide was restarted. The patient’s wife was met by Palliative Care to discuss the patient’s decline in functional status since his prior hip surgery one year prior. The patient completed the course of antibiotics.    1)UTI initially presenting with sepsis which is now resolved. Presented with toxic metabolic encephalopathy with underlying dementia   -s/p zosyn tx   -leucocytosis noted, no fevers, will monitor labs in am  -urine culture negative   -d/c cbi     Presenting with urinary retention - seen by Urology in ER, early placed - continue with early for 1 week until f/up   -c/w flomax in the interim     2) ALBERTO likely due to some degree of ATN with low flow state   -cr now normalized. will monitor     3) Dementia with decline   -seen by palliative care   -ct head noted   -c/w quetiapine, donepezil and memantine   -currently on 1:1 in the interim to ensure that he does not interfere with medical care (aka early)    4) Htn - well controlled with cad   -c/w metoprolol   -c/w statin     5) Hypoxemia with interval development of heart failure per xray imaging along with LE edema   -lasix was stopped on prior admission   -resume lasix and titrate down from O2   -duplex negative   -patient does not have a hx of heart failure documented. Given his decline, would tx symptomatically and avoid further testing that is not likely to change overall management     6) Severe protein calorie malnutrition - Nutrition evaluation noted. Encouraged oral intake.    7) DVT ppx: SQH started for now. d/c CBI and maintain early     9)Dispo: Dnr/i. prognosis guarded

## 2024-05-01 NOTE — PATIENT PROFILE ADULT. - MEDICATION HERBAL REMEDIES, PROFILE
We discussed how mineral sunscreens are safe and usually do not contain synthetic materials like chemical sunscreens. If he is interested in tinted sunscreens both Eucerin and EltaMD make excellent ones. Sun protection hand out was provided to the patient today.
Detail Level: Detailed
I recommended continuing using clobetasol/CeraVe 2 days per week in the winter to prevent flare
no